# Patient Record
Sex: MALE | Race: ASIAN | NOT HISPANIC OR LATINO | Employment: FULL TIME | URBAN - METROPOLITAN AREA
[De-identification: names, ages, dates, MRNs, and addresses within clinical notes are randomized per-mention and may not be internally consistent; named-entity substitution may affect disease eponyms.]

---

## 2021-05-25 NOTE — LETTER
[employer]  Timi Varner M.D.  [address] [place] [address]  Fax: [phone]   Authorization for Release/Disclosure of   Protected Health Information   Name: DEACON WATSON : 1960 SSN: [id number]   Address: [address] [place] [address]  [place] [address] Phone:    [phone] (home)    I authorize the entity listed below to release/disclose the PHI below to:   [facility] [employer]/Timi Varner M.D. and Mily Nunez M.D.   Provider or Entity Name:  [facility]: Dr. Lizy Salmeron M.D.   Address   City, State, Zip   Phone:  [phone]    Fax:  [phone]   Reason for request: continuity of care   Information to be released:    [  ] LAST COLONOSCOPY,  including any PATH REPORT and follow-up  [  ] LAST FIT/COLOGUARD RESULT [  ] LAST DEXA  [  ] LAST MAMMOGRAM  [  ] LAST PAP  [  ] LAST LABS [  ] RETINA EXAM REPORT  [  ] IMMUNIZATION RECORDS  [ XXXX ] Release all info      [  ] Check here and initial the line next to each item to release ALL health information INCLUDING  _____ Care and treatment for drug and / or alcohol abuse  _____ HIV testing, infection status, or AIDS  _____ Genetic Testing    DATES OF SERVICE OR TIME PERIOD TO BE DISCLOSED: _____________  I understand and acknowledge that:  * This Authorization may be revoked at any time by you in writing, except if your health information has already been used or disclosed.  * Your health information that will be used or disclosed as a result of you signing this authorization could be re-disclosed by the recipient. If this occurs, your re-disclosed health information may no longer be protected by State or Federal laws.  * You may refuse to sign this Authorization. Your refusal will not affect your ability to obtain treatment.  * This Authorization becomes effective upon signing and will  on (date) __________.      If no date is indicated, this Authorization will  one (1) year from the signature date.    Name: Deacon  Arvind    Signature: Continuity of Care   Date:     5/25/2021       PLEASE FAX REQUESTED RECORDS BACK TO: [phone]

## 2021-05-25 NOTE — PROGRESS NOTES
NEW PATIENT VISIT PRE-VISIT PLANNING      1st attempt: Tuesday, 05/25/201@1:07PM  Phone Number Called: [phone] (home)   Call outcome: Spoke to patient regarding message below.  Message: Advised office visit scheduled with , Thursday, 05/27/21@4:00PM, [address]. Patient ended call stating he was at work. Request patient return call. Need to complete Pre-Visit Planning.    Returned call: Tuesday, 05/25/2021@3:49PM  1. Caller Name: Kenneth Samuels      Call Back Number: [phone] (home)  Patient returned call. Pre-Visit planning completed.     1.  E[employer] Patient is checked in Patient Demographics?Yes    2.  Immunizations were updated in Epic using Reconcile Outside Information activity? Yes         3.  Is this appointment scheduled as a Hospital Follow-Up? No    4.  Patient is due for the following Health Maintenance Topics:   Health Maintenance Due   Topic Date Due   • HEPATITIS C SCREENING  Never done   • IMM DTaP/Tdap/Td Vaccine (1 - Tdap) Never done   • COLONOSCOPY  Never done   • IMM ZOSTER VACCINES (1 of 2) Never done     5.  Reviewed/Updated the following with patient:       •   Preferred Pharmacy? Yes       •   Preferred Lab? Yes       •   Preferred Communication? Yes       •   Allergies? Yes       •   Medications? Yes       •   Social History? Yes       •   Family History (document living status of immediate family members and if + hx of  cancer, diabetes, hypertension, hyperlipidemia, heart attack, stroke): Reviewed with patient    6.  Updated Care Team?       •   DME Company (gait device, O2, CPAP, etc.): Per patient none       •   Other Specialists (eye doctor, derm, GYN, cardiology, endo, etc): Prior PCP Dr. Lizy Salmeron M.D.-[facility]. Care Teams updated and medical records requested.    7.  AHA (Puls[employer]) form printed for Provider? N/A

## 2021-05-27 ENCOUNTER — OFFICE VISIT (OUTPATIENT)
Dept: MEDICAL GROUP | Age: 61
End: 2021-05-27
Payer: COMMERCIAL

## 2021-05-27 VITALS
WEIGHT: 144 LBS | HEIGHT: 68 IN | HEART RATE: 73 BPM | OXYGEN SATURATION: 100 % | TEMPERATURE: 98.6 F | DIASTOLIC BLOOD PRESSURE: 80 MMHG | SYSTOLIC BLOOD PRESSURE: 120 MMHG | BODY MASS INDEX: 21.82 KG/M2

## 2021-05-27 DIAGNOSIS — Z12.12 SCREENING FOR COLORECTAL CANCER: ICD-10-CM

## 2021-05-27 DIAGNOSIS — M21.70 LEG LENGTH DISCREPANCY: ICD-10-CM

## 2021-05-27 DIAGNOSIS — R35.0 BENIGN PROSTATIC HYPERPLASIA WITH URINARY FREQUENCY: ICD-10-CM

## 2021-05-27 DIAGNOSIS — Z76.89 ENCOUNTER TO ESTABLISH CARE WITH NEW DOCTOR: Primary | ICD-10-CM

## 2021-05-27 DIAGNOSIS — M54.15 RADICULOPATHY OF THORACOLUMBAR REGION: ICD-10-CM

## 2021-05-27 DIAGNOSIS — Z12.11 SCREENING FOR COLORECTAL CANCER: ICD-10-CM

## 2021-05-27 DIAGNOSIS — Q67.5 CONGENITAL SCOLIOSIS: ICD-10-CM

## 2021-05-27 DIAGNOSIS — Z11.59 NEED FOR HEPATITIS C SCREENING TEST: ICD-10-CM

## 2021-05-27 DIAGNOSIS — N40.1 BENIGN PROSTATIC HYPERPLASIA WITH URINARY FREQUENCY: ICD-10-CM

## 2021-05-27 DIAGNOSIS — Z00.00 PE (PHYSICAL EXAM), ANNUAL: ICD-10-CM

## 2021-05-27 LAB
APPEARANCE UR: CLEAR
BILIRUB UR STRIP-MCNC: NEGATIVE MG/DL
COLOR UR AUTO: YELLOW
GLUCOSE UR STRIP.AUTO-MCNC: NEGATIVE MG/DL
KETONES UR STRIP.AUTO-MCNC: NEGATIVE MG/DL
LEUKOCYTE ESTERASE UR QL STRIP.AUTO: NEGATIVE
NITRITE UR QL STRIP.AUTO: NEGATIVE
PH UR STRIP.AUTO: 5.5 [PH] (ref 5–8)
PROT UR QL STRIP: NEGATIVE MG/DL
RBC UR QL AUTO: NEGATIVE
SP GR UR STRIP.AUTO: 1.02
UROBILINOGEN UR STRIP-MCNC: 0.2 MG/DL

## 2021-05-27 PROCEDURE — 99204 OFFICE O/P NEW MOD 45 MIN: CPT | Performed by: FAMILY MEDICINE

## 2021-05-27 PROCEDURE — 81002 URINALYSIS NONAUTO W/O SCOPE: CPT | Performed by: FAMILY MEDICINE

## 2021-05-27 RX ORDER — TAMSULOSIN HYDROCHLORIDE 0.4 MG/1
0.4 CAPSULE ORAL
Qty: 90 CAPSULE | Refills: 1 | Status: SHIPPED | OUTPATIENT
Start: 2021-05-27 | End: 2022-10-10

## 2021-05-27 RX ORDER — GABAPENTIN 100 MG/1
100 CAPSULE ORAL 3 TIMES DAILY
Qty: 90 CAPSULE | Refills: 11 | Status: SHIPPED | OUTPATIENT
Start: 2021-05-27 | End: 2022-10-10

## 2021-05-27 ASSESSMENT — PATIENT HEALTH QUESTIONNAIRE - PHQ9: CLINICAL INTERPRETATION OF PHQ2 SCORE: 0

## 2021-05-27 NOTE — LETTER
[facility]  Mily Nunez M.D.  [address]   [place] [address]  Fax: [phone]   Authorization for Release/Disclosure of   Protected Health Information   Name: DEACON WATSON : 1960 SSN: [id number]   Address: [address] [place] [address] Phone:    [phone] (home)    I authorize the entity listed below to release/disclose the PHI below to:   [facility]/Mily Nunez M.D. and Mily Nunez M.D.   Provider or Entity Name:  [facility]   Address   City, State, [facility]   Phone:      Fax:  [phone]   Reason for request: continuity of care   Information to be released:    [ x ] LAST COLONOSCOPY,  including any PATH REPORT and follow-up  [  ] LAST FIT/COLOGUARD RESULT [  ] LAST DEXA  [  ] LAST MAMMOGRAM  [  ] LAST PAP  [  ] LAST LABS [  ] RETINA EXAM REPORT  [  ] IMMUNIZATION RECORDS  [  ] Release all info      [  ] Check here and initial the line next to each item to release ALL health information INCLUDING  _____ Care and treatment for drug and / or alcohol abuse  _____ HIV testing, infection status, or AIDS  _____ Genetic Testing    DATES OF SERVICE OR TIME PERIOD TO BE DISCLOSED: _____________  I understand and acknowledge that:  * This Authorization may be revoked at any time by you in writing, except if your health information has already been used or disclosed.  * Your health information that will be used or disclosed as a result of you signing this authorization could be re-disclosed by the recipient. If this occurs, your re-disclosed health information may no longer be protected by State or Federal laws.  * You may refuse to sign this Authorization. Your refusal will not affect your ability to obtain treatment.  * This Authorization becomes effective upon signing and will  on (date) __________.      If no date is indicated, this Authorization will  one (1) year from the signature date.    Name: Deacon Watson    Signature:   Date:     2021       PLEASE FAX REQUESTED RECORDS BACK TO: [phone]

## 2021-05-27 NOTE — PROGRESS NOTES
Subjective:   CC: establish care     HPI:     Kenneth Samuels is a 61 y.o. 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 He had corrective surgery when he was a child in [place]. He again had another corrective surgery in 1992 in the [facility]. He continues to have persistent chronic back pain with persistent numbness of the right leg. He is not able to sit or stand for a long time. He has leg length discrepancy. He wears inserts to correct this. He has consultation with [facility] a few years ago. Repeat MRI was ordered, but it was not completed as he is claustrophobic. He requested open MRI in [place], but states that his request was not approved by provider. He therefore has lost followed up with [facility]. At this time, he is not sure if he wants to pursue further treatment. He states that he has been discriminated by his employer and was released from work several times due to his disability. He wants to apply for permanent disability, but he is also not sure. He states that he is not able to stand for a long time, so he needs accommodation at work. He is not able to lift well. So he has to rely on coworker to help with this. However, all coworkers are eventually tired of needing to help him with his work without extra pay. He is , has 1 child, not sexually active. He has no known diseases. He currently dose not take any medication.     He complained of frequent nocturia and daytime urinary frequency, onset a few years ago, but recently got worse. Neg fever, chills, flank pain, dysuria, hematuria.       Current medicines (including changes today)  Current Outpatient Medications   Medication Sig Dispense Refill   • tamsulosin (FLOMAX) 0.4 MG capsule Take 1 capsule by mouth 1/2 hour after breakfast. 90 capsule 1   • gabapentin (NEURONTIN) 100 MG Cap Take 1 capsule by  "mouth 3 times a day. 90 capsule 11   • Ascorbic Acid (VITAMIN C PO) Take  by mouth.       No current facility-administered medications for this visit.     He  has no past medical history of ASTHMA, COPD, or Diabetes.    I personally reviewed patient's problem list, allergies, medications, family hx, social hx with patient and update EPIC.     REVIEW OF SYSTEMS:  CONSTITUTIONAL:  Denies night sweats, fatigue, malaise, lethargy, fever or chills.  RESPIRATORY:  Denies cough, wheeze, hemoptysis, or shortness of breath.  CARDIOVASCULAR:  Denies chest pains, palpitations, pedal edema     Objective:     /80 (BP Location: Right arm, Patient Position: Sitting, BP Cuff Size: Adult)   Pulse 73   Temp 37 °C (98.6 °F) (Temporal)   Ht 1.727 m (5' 8\")   Wt 65.3 kg (144 lb)   SpO2 100%  Body mass index is 21.9 kg/m².    Physical Exam:  Constitutional: awake, alert, in no distress.  Skin: Warm, dry, good turgor, no rashes, bruises, ulcers in visible areas.  Eye: conjunctiva clear, lids neg for edema or lesions.  ENMT: TM and auditory canals wnl.    Neck: Trachea midline, no masses, no thyromegaly. No cervical or supraclavicular lymphadenopathy  Respiratory: Unlabored respiratory effort, lungs clear to auscultation, no wheezes, no rales.  Cardiovascular: Normal S1, S2, no murmur, no pedal edema.  Abdomen: Soft, non-tender to palpation, active BS, no hernia, no   Psych: Oriented x3, affect and mood wnl, intact judgement and insight.   MSK:   - thoracolumbar scoliosis  - leg length discrepancy       Assessment and Plan:   The following treatment plan was discussed    1. Congenital scoliosis  2. Radiculopathy of thoracolumbar region  Congential scoliosis s/p 2 corrective surgeries: one during childhood in [place], last surgery was in 1992. He continues to have radiculopathy and chronic lower back pain interfering with his ability to maintain full time employment and quality of life. He was seen by [facility] a few years " ago, but lost follow up after he was not able to complete the MRI due to claustrophobia. He is not sure of his options at this time, but he wants to see neurosurgery again for consultation.   - REFERRAL TO NEUROSURGERY  - gabapentin (NEURONTIN) 100 MG Cap; Take 1 capsule by mouth 3 times a day.  Dispense: 90 capsule; Refill: 11    3. Leg length discrepancy  Secondary to severe thoracolumbar scoliosis   Wears shoe inserts     4. Benign prostatic hyperplasia with urinary frequency  - tamsulosin (FLOMAX) 0.4 MG capsule; Take 1 capsule by mouth 1/2 hour after breakfast.  Dispense: 90 capsule; Refill: 1  - POCT Urinalysis    5. Screening for colorectal cancer   COLOGUARD (FIT DNA)    6. Need for hepatitis C screening test  - HEP C VIRUS ANTIBODY; Future    7. Encounter to establish care with new doctor  General health and wellness counseling provided.     - CBC WITH DIFFERENTIAL; Future  - Comp Metabolic Panel; Future  - HEMOGLOBIN A1C; Future  - Lipid Profile; Future  - POCT Urinalysis    Mily Nunez M.D.      Followup: Return for As needed.    Please note that this dictation was created using voice recognition software. I have made every reasonable attempt to correct obvious errors, but I expect that there are errors of grammar and possibly content that I did not discover before finalizing the note.

## 2021-05-27 NOTE — LETTER
[facility]  Mily Nunez M.D.  [address]   [place] [address]  Fax: [phone]   Authorization for Release/Disclosure of   Protected Health Information   Name: DEACON WATSON : 1960 SSN: [id number]   Address: [address] [place] [address] Phone:    [phone] (home)    I authorize the entity listed below to release/disclose the PHI below to:   [facility]/Mily Nunez M.D. and Mily Nunez M.D.   Provider or Entity Name:  [facility]   Phone:      Fax:  [phone]   Reason for request: continuity of care   Information to be released:    [  ] LAST COLONOSCOPY,  including any PATH REPORT and follow-up  [  ] LAST FIT/COLOGUARD RESULT [  ] LAST DEXA  [  ] LAST MAMMOGRAM  [  ] LAST PAP  [  ] LAST LABS [  ] RETINA EXAM REPORT  [  ] IMMUNIZATION RECORDS  [x  ] Release all info      [  ] Check here and initial the line next to each item to release ALL health information INCLUDING  _____ Care and treatment for drug and / or alcohol abuse  _____ HIV testing, infection status, or AIDS  _____ Genetic Testing    DATES OF SERVICE OR TIME PERIOD TO BE DISCLOSED: _____________  I understand and acknowledge that:  * This Authorization may be revoked at any time by you in writing, except if your health information has already been used or disclosed.  * Your health information that will be used or disclosed as a result of you signing this authorization could be re-disclosed by the recipient. If this occurs, your re-disclosed health information may no longer be protected by State or Federal laws.  * You may refuse to sign this Authorization. Your refusal will not affect your ability to obtain treatment.  * This Authorization becomes effective upon signing and will  on (date) __________.      If no date is indicated, this Authorization will  one (1) year from the signature date.    Name: Deacon Watson    Signature:   Date:     2021       PLEASE FAX REQUESTED RECORDS BACK TO:  [phone]

## 2021-05-29 ENCOUNTER — HOSPITAL ENCOUNTER (OUTPATIENT)
Dept: LAB | Facility: MEDICAL CENTER | Age: 61
End: 2021-05-29
Attending: FAMILY MEDICINE
Payer: COMMERCIAL

## 2021-05-29 DIAGNOSIS — Z11.59 NEED FOR HEPATITIS C SCREENING TEST: ICD-10-CM

## 2021-05-29 DIAGNOSIS — Z00.00 PE (PHYSICAL EXAM), ANNUAL: ICD-10-CM

## 2021-05-29 LAB
ALBUMIN SERPL BCP-MCNC: 4.3 G/DL (ref 3.2–4.9)
ALBUMIN/GLOB SERPL: 1.5 G/DL
ALP SERPL-CCNC: 51 U/L (ref 30–99)
ALT SERPL-CCNC: 14 U/L (ref 2–50)
ANION GAP SERPL CALC-SCNC: 7 MMOL/L (ref 7–16)
AST SERPL-CCNC: 19 U/L (ref 12–45)
BASOPHILS # BLD AUTO: 0.9 % (ref 0–1.8)
BASOPHILS # BLD: 0.04 K/UL (ref 0–0.12)
BILIRUB SERPL-MCNC: 1.4 MG/DL (ref 0.1–1.5)
BUN SERPL-MCNC: 11 MG/DL (ref 8–22)
CALCIUM SERPL-MCNC: 9 MG/DL (ref 8.5–10.5)
CHLORIDE SERPL-SCNC: 101 MMOL/L (ref 96–112)
CHOLEST SERPL-MCNC: 182 MG/DL (ref 100–199)
CO2 SERPL-SCNC: 29 MMOL/L (ref 20–33)
CREAT SERPL-MCNC: 0.74 MG/DL (ref 0.5–1.4)
EOSINOPHIL # BLD AUTO: 0.32 K/UL (ref 0–0.51)
EOSINOPHIL NFR BLD: 7.5 % (ref 0–6.9)
ERYTHROCYTE [DISTWIDTH] IN BLOOD BY AUTOMATED COUNT: 42.9 FL (ref 35.9–50)
EST. AVERAGE GLUCOSE BLD GHB EST-MCNC: 105 MG/DL
FASTING STATUS PATIENT QL REPORTED: NORMAL
GLOBULIN SER CALC-MCNC: 2.8 G/DL (ref 1.9–3.5)
GLUCOSE SERPL-MCNC: 108 MG/DL (ref 65–99)
HBA1C MFR BLD: 5.3 % (ref 4–5.6)
HCT VFR BLD AUTO: 47 % (ref 42–52)
HCV AB SER QL: NORMAL
HDLC SERPL-MCNC: 49 MG/DL
HGB BLD-MCNC: 15.8 G/DL (ref 14–18)
IMM GRANULOCYTES # BLD AUTO: 0 K/UL (ref 0–0.11)
IMM GRANULOCYTES NFR BLD AUTO: 0 % (ref 0–0.9)
LDLC SERPL CALC-MCNC: 119 MG/DL
LYMPHOCYTES # BLD AUTO: 1.83 K/UL (ref 1–4.8)
LYMPHOCYTES NFR BLD: 42.7 % (ref 22–41)
MCH RBC QN AUTO: 31.7 PG (ref 27–33)
MCHC RBC AUTO-ENTMCNC: 33.6 G/DL (ref 33.7–35.3)
MCV RBC AUTO: 94.4 FL (ref 81.4–97.8)
MONOCYTES # BLD AUTO: 0.34 K/UL (ref 0–0.85)
MONOCYTES NFR BLD AUTO: 7.9 % (ref 0–13.4)
NEUTROPHILS # BLD AUTO: 1.76 K/UL (ref 1.82–7.42)
NEUTROPHILS NFR BLD: 41 % (ref 44–72)
NRBC # BLD AUTO: 0 K/UL
NRBC BLD-RTO: 0 /100 WBC
PLATELET # BLD AUTO: 234 K/UL (ref 164–446)
PMV BLD AUTO: 9.9 FL (ref 9–12.9)
POTASSIUM SERPL-SCNC: 4.6 MMOL/L (ref 3.6–5.5)
PROT SERPL-MCNC: 7.1 G/DL (ref 6–8.2)
RBC # BLD AUTO: 4.98 M/UL (ref 4.7–6.1)
SODIUM SERPL-SCNC: 137 MMOL/L (ref 135–145)
TRIGL SERPL-MCNC: 69 MG/DL (ref 0–149)
WBC # BLD AUTO: 4.3 K/UL (ref 4.8–10.8)

## 2021-05-29 PROCEDURE — 80053 COMPREHEN METABOLIC PANEL: CPT

## 2021-05-29 PROCEDURE — 36415 COLL VENOUS BLD VENIPUNCTURE: CPT

## 2021-05-29 PROCEDURE — 80061 LIPID PANEL: CPT

## 2021-05-29 PROCEDURE — 85025 COMPLETE CBC W/AUTO DIFF WBC: CPT

## 2021-05-29 PROCEDURE — 83036 HEMOGLOBIN GLYCOSYLATED A1C: CPT

## 2021-05-29 PROCEDURE — 86803 HEPATITIS C AB TEST: CPT

## 2021-06-03 DIAGNOSIS — Z00.00 PE (PHYSICAL EXAM), ANNUAL: ICD-10-CM

## 2021-06-03 PROBLEM — E78.00 PURE HYPERCHOLESTEROLEMIA: Status: ACTIVE | Noted: 2021-06-03

## 2021-06-03 PROBLEM — R73.01 IMPAIRED FASTING BLOOD SUGAR: Status: ACTIVE | Noted: 2021-06-03

## 2021-11-08 ENCOUNTER — NON-PROVIDER VISIT (OUTPATIENT)
Dept: OCCUPATIONAL MEDICINE | Facility: CLINIC | Age: 61
End: 2021-11-08
Payer: COMMERCIAL

## 2021-11-08 ENCOUNTER — HOSPITAL ENCOUNTER (EMERGENCY)
Facility: MEDICAL CENTER | Age: 61
End: 2021-11-08
Attending: EMERGENCY MEDICINE
Payer: COMMERCIAL

## 2021-11-08 VITALS
SYSTOLIC BLOOD PRESSURE: 130 MMHG | HEART RATE: 79 BPM | HEIGHT: 66 IN | TEMPERATURE: 98.7 F | DIASTOLIC BLOOD PRESSURE: 99 MMHG | OXYGEN SATURATION: 97 % | RESPIRATION RATE: 18 BRPM | WEIGHT: 149.03 LBS | BODY MASS INDEX: 23.95 KG/M2

## 2021-11-08 DIAGNOSIS — R04.0 EPISTAXIS: ICD-10-CM

## 2021-11-08 DIAGNOSIS — Z02.89 VISIT FOR OCCUPATIONAL HEALTH EXAMINATION: Primary | ICD-10-CM

## 2021-11-08 DIAGNOSIS — Z77.098 CHEMICAL EXPOSURE: ICD-10-CM

## 2021-11-08 LAB
AMP AMPHETAMINE: NORMAL
BAR BARBITURATES: NORMAL
BREATH ALCOHOL COMMENT: NORMAL
BZO BENZODIAZEPINES: NORMAL
COC COCAINE: NORMAL
INT CON NEG: NORMAL
INT CON POS: NORMAL
MDMA ECSTASY: NORMAL
MET METHAMPHETAMINES: NORMAL
MTD METHADONE: NORMAL
OPI OPIATES: NORMAL
OXY OXYCODONE: NORMAL
PCP PHENCYCLIDINE: NORMAL
POC BREATHALIZER: 0 PERCENT (ref 0–0.01)
POC URINE DRUG SCREEN OCDRS: NORMAL
THC: NORMAL

## 2021-11-08 PROCEDURE — A9270 NON-COVERED ITEM OR SERVICE: HCPCS | Performed by: EMERGENCY MEDICINE

## 2021-11-08 PROCEDURE — 82075 ASSAY OF BREATH ETHANOL: CPT | Performed by: PREVENTIVE MEDICINE

## 2021-11-08 PROCEDURE — 700102 HCHG RX REV CODE 250 W/ 637 OVERRIDE(OP): Performed by: EMERGENCY MEDICINE

## 2021-11-08 PROCEDURE — 99283 EMERGENCY DEPT VISIT LOW MDM: CPT

## 2021-11-08 PROCEDURE — 700101 HCHG RX REV CODE 250: Performed by: EMERGENCY MEDICINE

## 2021-11-08 PROCEDURE — 80305 DRUG TEST PRSMV DIR OPT OBS: CPT | Performed by: PREVENTIVE MEDICINE

## 2021-11-08 RX ORDER — HYDROCODONE BITARTRATE AND ACETAMINOPHEN 5; 325 MG/1; MG/1
1 TABLET ORAL EVERY 6 HOURS PRN
Qty: 10 TABLET | Refills: 0 | Status: SHIPPED | OUTPATIENT
Start: 2021-11-08 | End: 2021-11-11

## 2021-11-08 RX ORDER — AMOXICILLIN 250 MG/1
500 CAPSULE ORAL ONCE
Status: COMPLETED | OUTPATIENT
Start: 2021-11-08 | End: 2021-11-08

## 2021-11-08 RX ORDER — LIDOCAINE HYDROCHLORIDE AND EPINEPHRINE 10; 10 MG/ML; UG/ML
20 INJECTION, SOLUTION INFILTRATION; PERINEURAL ONCE
Status: COMPLETED | OUTPATIENT
Start: 2021-11-08 | End: 2021-11-08

## 2021-11-08 RX ORDER — BENZOCAINE/MENTHOL 6 MG-10 MG
1 LOZENGE MUCOUS MEMBRANE 2 TIMES DAILY
Qty: 1.5 G | Refills: 0 | Status: SHIPPED | OUTPATIENT
Start: 2021-11-08 | End: 2021-11-11

## 2021-11-08 RX ORDER — HYDROCODONE BITARTRATE AND ACETAMINOPHEN 5; 325 MG/1; MG/1
1 TABLET ORAL ONCE
Status: COMPLETED | OUTPATIENT
Start: 2021-11-08 | End: 2021-11-08

## 2021-11-08 RX ORDER — ECHINACEA PURPUREA EXTRACT 125 MG
1 TABLET ORAL PRN
Qty: 88 ML | Refills: 0 | Status: SHIPPED | OUTPATIENT
Start: 2021-11-08 | End: 2021-11-11

## 2021-11-08 RX ORDER — AMOXICILLIN 500 MG/1
500 CAPSULE ORAL 3 TIMES DAILY
Qty: 30 CAPSULE | Refills: 0 | Status: SHIPPED | OUTPATIENT
Start: 2021-11-08 | End: 2022-10-10

## 2021-11-08 RX ADMIN — HYDROCODONE BITARTRATE AND ACETAMINOPHEN 1 TABLET: 5; 325 TABLET ORAL at 14:15

## 2021-11-08 RX ADMIN — AMOXICILLIN 500 MG: 250 CAPSULE ORAL at 14:15

## 2021-11-08 RX ADMIN — LIDOCAINE HYDROCHLORIDE AND EPINEPHRINE 20 ML: 10; 10 INJECTION, SOLUTION INFILTRATION; PERINEURAL at 14:00

## 2021-11-08 RX ADMIN — PHENYLEPHRINE HYDROCHLORIDE 1 SPRAY: 1 SPRAY NASAL at 14:00

## 2021-11-08 ASSESSMENT — FIBROSIS 4 INDEX: FIB4 SCORE: 1.32

## 2021-11-08 NOTE — ED PROVIDER NOTES
ED Provider Note    Scribed for Gabrielle Bo M.D. by Laura Myers. 11/8/2021  12:31 PM    Primary care provider: Mily Nunez M.D.  Means of arrival: Walk in  History obtained from: patient   History limited by: none    CHIEF COMPLAINT  Chief Complaint   Patient presents with    Nose Bleed    Rash    Chemical Exposure       HPI  Kenneth Samuels is a 61 y.o. male who presents to the Emergency Department for evaluation after a chemical exposure onset today at work. 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 He has associated epistaxis and rash to his face. Patient was not wearing a mask for eye protection during his exposure. Denies shortness of breath.     REVIEW OF SYSTEMS  HEENT:  + epistaxis. No ear pain, congestion, or sore throat   EYES: no discharge, redness, or vision changes  CARDIAC: no chest pain, no palpitations    PULMONARY: no dyspnea, cough, or congestion   GI: no vomiting, diarrhea, or abdominal pain   : no dysuria, back pain, or hematuria   Neuro: no weakness, numbness, aphasia, or headache  Musculoskeletal: no swelling, deformity, pain, or joint swelling  Endocrine: no fevers, sweating, or weight loss   SKIN: + rash to face. no erythema, or contusions     See history of present illness.     PAST MEDICAL HISTORY   None pertinent    SURGICAL HISTORY  patient denies any surgical history    SOCIAL HISTORY  Social History     Tobacco Use    Smoking status: Never Smoker    Smokeless tobacco: Never Used   Vaping Use    Vaping Use: Never used   Substance Use Topics    Alcohol use: Not Currently    Drug use: Never      Social History     Substance and Sexual Activity   Drug Use Never       FAMILY HISTORY  Family History   Problem Relation Age of Onset    No Known Problems Mother     No Known Problems Father     No Known Problems Sister     No Known Problems Brother     No Known Problems Maternal Grandmother     No Known Problems Maternal  "Grandfather     No Known Problems Paternal Grandmother     No Known Problems Paternal Grandfather     No Known Problems Brother        CURRENT MEDICATIONS  Home Medications    **Home medications have not yet been reviewed for this encounter**         ALLERGIES  No Known Allergies    PHYSICAL EXAM  VITAL SIGNS: /100   Pulse 87   Temp 36.9 °C (98.5 °F) (Temporal)   Resp 18   Ht 1.676 m (5' 6\")   Wt 67.6 kg (149 lb 0.5 oz)   SpO2 96%   BMI 24.05 kg/m²     Constitutional: Well developed, Well nourished, No acute distress, Non-toxic appearance.   HEENT: Normocephalic, Atraumatic,  external ears normal, pharynx pink,  Mucous  Membranes moist, No rhinorrhea or mucosal edema. Trickling blood mostly from left nares. No heavy bleeding bleeding in the posterior pharynx.  Mild skin erythema around the mouth but no rash in the buccal mucosal or buccal mucosal swelling  Eyes: PERRL, EOMI, Conjunctiva normal, No discharge.   Neck: Normal range of motion, No tenderness, Supple, No stridor.   Lymphatic: No lymphadenopathy    Cardiovascular: Regular Rate and Rhythm, No murmurs,  rubs, or gallops.   Thorax & Lungs: Lungs clear to auscultation bilaterally, No airway swelling No respiratory distress, No wheezes, rhales or rhonchi, No chest wall tenderness.   Abdomen: Bowel sounds normal, Soft, non tender, non distended,  No pulsatile masses., no rebound guarding or peritoneal signs.   Skin: Warm, Dry, Redness around face, nose and mouth.  Back:  No CVA tenderness,  No spinal tenderness, bony crepitance, step offs, or instability.   Neurologic: Alert & oriented clear speech no focal deficits  Extremities: Equal, intact distal pulses, No cyanosis, clubbing or edema,  No tenderness.   Musculoskeletal: Good range of motion in all major joints. No tenderness to palpation or major deformities noted.       COURSE & MEDICAL DECISION MAKING  Nursing notes, VS, PMSFHx reviewed in chart.    12:31 PM Patient seen and examined at " bedside. Spoke with [employer], case number [id number], who states the chemical is an irritant and he needs to shower to remove the chemical from his skin. We will decon and shower the patient in the ED. Advised him to wear a face shield and mask while at work. Prescribed hydrocortizone cream to apply to his face and ocean nasal spray.     1:33 PM - Patient has continuing nasal bleeding. He will be treated with phenylephrine and lidocaine with epinephrine 1% soaked gauze.     2:11 PM - Packed the patients nose at this time. He will be treated with Norco. Prescribed amoxicillin and advised home to have the packing removed in around 2 days. Discussed return precautions. Patient will be discharged at this time. He verbalizes agreement with discharge and plan of care.     The patient will return for new or worsening symptoms and is stable at the time of discharge.  In prescribing controlled substances to this patient, I certify that I have obtained and reviewed the medical history of Kenneth Samuels. I have also made a good adia effort to obtain applicable records from other providers who have treated the patient and records did not demonstrate any increased risk of substance abuse that would prevent me from prescribing controlled substances.     I have conducted a physical exam and documented it. I have reviewed Mr. Samuels’s prescription history as maintained by the [place] Prescription Monitoring Program.     I have assessed the patient’s risk for abuse, dependency, and addiction using the validated Opioid Risk Tool available at https://www.mdcalc.com/zhndfl-dhkk-vipb-ort-narcotic-abuse.     Given the above, I believe the benefits of controlled substance therapy outweigh the risks. The reasons for prescribing controlled substances include non-narcotic, oral analgesic alternatives have been inadequate for pain control. Accordingly, I have discussed the risk and benefits, treatment plan, and alternative therapies with the  patient.       DISPOSITION:  Patient will be discharged home in stable condition.    FOLLOW UP:  [facility]  [address]  [address]  [place] [address] [phone]  Call in 1 day  for recheck, to establish care and for packing removal      OUTPATIENT MEDICATIONS:  Discharge Medication List as of 11/8/2021  3:06 PM        START taking these medications    Details   hydrocortisone 1 % Cream Apply 1 Application topically 2 times a day for 3 days., Disp-1.5 g, R-0, Normal      sodium chloride (OCEAN) 0.65 % Solution Administer 1 Spray into affected nostril(S) as needed for Congestion for up to 3 days., Disp-88 mL, R-0, Normal      HYDROcodone-acetaminophen (NORCO) 5-325 MG Tab per tablet Take 1 Tablet by mouth every 6 hours as needed for up to 3 days., Disp-10 Tablet, R-0, Normal      amoxicillin (AMOXIL) 500 MG Cap Take 1 Capsule by mouth 3 times a day., Disp-30 Capsule, R-0, Normal            FINAL IMPRESSION  1. Epistaxis    2. Chemical exposure          Laura ETIENNE (Scribe), am scribing for, and in the presence of, Gabrielle Bo M.D..    Electronically signed by: Laura Myers (Fuad), 11/8/2021    Gabrielle ETIENNE M.D. personally performed the services described in this documentation, as scribed by Laura Myers in my presence, and it is both accurate and complete.    The note accurately reflects work and decisions made by me.  Gabrielle Bo M.D.  11/8/2021  6:51 PM

## 2021-11-08 NOTE — ED NOTES
Discharge instructions given and discussed. RX sent to preferred pharmacy Pt educated to come back to ER for new or worsening symptoms and follow up with [facility] for f/u and packing removal as instructed. Pt verbalized understanding. VSS. Pt  Discharged in stable condition.

## 2021-11-08 NOTE — ED TRIAGE NOTES
"Chief Complaint   Patient presents with   • Nose Bleed   • Rash   • Chemical Exposure     sudden onset of rash and nose bleed today at work. 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 Pt bib by supervisor and states pt might have been exposed to a ceramic compound, duralco 128, commonly used in pt's work setting.   "

## 2021-11-08 NOTE — LETTER
"  FORM C-4:  EMPLOYEE’S CLAIM FOR COMPENSATION/ REPORT OF INITIAL TREATMENT  EMPLOYEE’S CLAIM - PROVIDE ALL INFORMATION REQUESTED   First Name Kenneth Last Name Arvind Birthdate 1960  Sex male Claim Number   Home Address [address]             Zip [address]                                   Age  61 y.o. Height  1.676 m (5' 6\") Weight  67.6 kg (149 lb 0.5 oz) [facility]     Mailing Address [address]              Zip [address] Telephone  [phone] (home)  Primary Language Spoken   Insurer   Third Party   [employer] Employee's Occupation (Job Title) When Injury or Occupational Disease Occurred     Employer's Name [employer] Telephone [phone]    Employer Address [address] [[address]] Zip [address]   Date of Injury  11/8/2021       Hour of Injury  10:00 AM Date Employer Notified  11/8/2021 Last Day of Work after Injury or Occupational Disease  11/8/2021 Supervisor to Whom Injury Reported  Baldo Ngo   Address or Location of Accident (if applicable) Work [1]   What were you doing at the time of accident? (if applicable) working    How did this injury or occupational disease occur? Be specific and answer in detail. Use additional sheet if necessary)  working and start to get alergic reaction on hands, mouth   If you believe that you have an occupational disease, when did you first have knowledge of the disability and it relationship to your employment? n/a Witnesses to the Accident  Chela Franklin   Nature of Injury or Occupational Disease  Workers' Compensation Part(s) of Body Injured or Affected  Hand (L), Mouth, Nose    I CERTIFY THAT THE ABOVE IS TRUE AND CORRECT TO THE BEST OF MY KNOWLEDGE AND THAT I HAVE PROVIDED THIS INFORMATION IN ORDER TO OBTAIN THE BENEFITS OF [place] IN[place]IVE OR CHAPTER 617 OF [place]).  I " HEREBY AUTHORIZE ANY PHYSICIAN, CHIROPRACTOR, SURGEON, PRACTITIONER, OR OTHER PERSON, ANY HOSPITAL, INCLUDING [facility] OR [facility], ANY MEDICAL SERVICE ORGANIZATION, ANY INSURANCE COMPANY, OR OTHER INSTITUTION OR ORGANIZATION TO RELEASE TO EACH OTHER, ANY MEDICAL OR OTHER INFORMATION, INCLUDING BENEFITS PAID OR PAYABLE, PERTINENT TO THIS INJURY OR DISEASE, EXCEPT INFORMATION RELATIVE TO DIAGNOSIS, TREATMENT AND/OR COUNSELING FOR AIDS, PSYCHOLOGICAL CONDITIONS, ALCOHOL OR CONTROLLED SUBSTANCES, FOR WHICH I MUST GIVE SPECIFIC AUTHORIZATION.  A PHOTOSTAT OF THIS AUTHORIZATION SHALL BE AS VALID AS THE ORIGINAL.  Date 11/08/2021                            Place   [facility]                                   Employee’s Signature   THIS REPORT MUST BE COMPLETED AND MAILED WITHIN 3 WORKING DAYS OF TREATMENT   Place [facility], EMERGENCY DEPT                       Name of Facility [facility]   Date  11/8/2021 Diagnosis  (R04.0) Epistaxis  (Z77.098) Chemical exposure Is there evidence the injured employee was under the influence of alcohol and/or another controlled substance at the time of accident?   Hour  1:08 PM Description of Injury or Disease  Epistaxis  Chemical exposure No   Treatment  Washed patient   Have you advised the patient to remain off work five days or more?         No   X-Ray Findings    Comments:na If Yes   From Date    To Date      From information given by the employee, together with medical evidence, can you directly connect this injury or occupational disease as job incurred? Yes If No, is employee capable of: Full Duty  No Modified Duty  No   Is additional medical care by a physician indicated? Yes  Comments:occ health If Modified Duty, Specify any Limitations / Restrictions       Do you know of any previous injury or disease contributing to this condition or occupational disease? No    Date 11/8/2021 Print Doctor’s  "Name AnupamEja I certify the employer’s copy of this form was mailed on:   Address [address] [address] [phone] INSURER’S USE ONLY   Provider’s Tax ID Number  [id number] Telephone Dept: [phone]    Doctor’s Signature e-JUNI Gonzalez M.D. Degree  M.IRINA.      Form C-4 (rev.10/07)                                                                         BRIEF DESCRIPTION OF RIGHTS AND BENEFITS  (Pursuant to [place] 616C.050)    Notice of Injury or Occupational Disease (Incident Report Form C-1): If an injury or occupational disease (OD) arises out of and in the course of employment, you must provide written notice to your employer as soon as practicable, but no later than 7 days after the accident or OD. Your employer shall maintain a sufficient supply of the required forms.    Claim for Compensation (Form C-4): If medical treatment is sought, the form C-4 is available at the place of initial treatment. A completed \"Claim for Compensation\" (Form C-4) must be filed within 90 days after an accident or OD. The treating physician or chiropractor must, within 3 working days after treatment, complete and mail to the employer, the employer's insurer and third-party , the Claim for Compensation.    Medical Treatment: If you require medical treatment for your on-the-job injury or OD, you may be required to select a physician or chiropractor from a list provided by your workers’ compensation insurer, if it has contracted with an Organization for Managed Care (MCO) or Preferred Provider Organization (PPO) or providers of health care. If your employer has not entered into a contract with an MCO or PPO, you may select a physician or chiropractor from the Panel of Physicians and Chiropractors. Any medical costs related to your industrial injury or OD will be paid by your insurer.    Temporary Total Disability (TTD): If your doctor has certified that you are unable to work for a period of at " least 5 consecutive days, or 5 cumulative days in a 20-day period, or places restrictions on you that your employer does not accommodate, you may be entitled to TTD compensation.    Temporary Partial Disability (TPD): If the wage you receive upon reemployment is less than the compensation for TTD to which you are entitled, the insurer may be required to pay you TPD compensation to make up the difference. TPD can only be paid for a maximum of 24 months.    Permanent Partial Disability (PPD): When your medical condition is stable and there is an indication of a PPD as a result of your injury or OD, within 30 days, your insurer must arrange for an evaluation by a rating physician or chiropractor to determine the degree of your PPD. The amount of your PPD award depends on the date of injury, the results of the PPD evaluation, your age and wage.    Permanent Total Disability (PTD): If you are medically certified by a treating physician or chiropractor as permanently and totally disabled and have been granted a PTD status by your insurer, you are entitled to receive monthly benefits not to exceed 66 2/3% of your average monthly wage. The amount of your PTD payments is subject to reduction if you previously received a lump-sum PPD award.    Vocational Rehabilitation Services: You may be eligible for vocational rehabilitation services if you are unable to return to the job due to a permanent physical impairment or permanent restrictions as a result of your injury or occupational disease.    Transportation and Per Justin Reimbursement: You may be eligible for travel expenses and per justin associated with medical treatment.    Reopening: You may be able to reopen your claim if your condition worsens after claim closure.     Appeal Process: If you disagree with a written determination issued by the insurer or the insurer does not respond to your request, you may appeal to the Department of Administration, , by  following the instructions contained in your determination letter. You must appeal the determination within 70 days from the date of the determination letter at [address], [address], [place], [place] [address], or [address], [address], [place], [place] [address]. If you disagree with the  decision, you may appeal to the Department of Administration, . You must file your appeal within 30 days from the date of the  decision letter at [address], [address], [place], [place] [address], or [address], [address], [place], [place] [address]. If you disagree with a decision of an , you may file a petition for judicial review with the District Court. You must do so within 30 days of the Appeal Officer’s decision. You may be represented by an  at your own expense or you may contact the [facility] for possible representation.    [place]  for Injured Workers (NAIW): If you disagree with a  decision, you may request that NAIW represent you without charge at an  Hearing. For information regarding denial of benefits, you may contact the [facility] at: [address], [address], [place] [address], [phone], or [address], [address], [place] [address], [phone]    To File a Complaint with the Division: If you wish to file a complaint with the  of the Division of Industrial Relations (DIR),  please contact the Workers’ Compensation Section, [address], [address], [place], [place] [address], telephone [phone]) [phone], or [address], [address], [place], [place] [address], telephone [phone].    For assistance with Workers’ Compensation Issues: You may contact the [facility] Office for Consumer Health Assistance, [address], [address], [place], [place] [address], Toll Free 1-581.771.2309, Web site:  http://[facility].nv.gov/Steph/ANJEL E-mail: anjel@[facility].nv.gov  D-2 (rev. 10/20)              __________________________________________________________________                                    ___11/08/2021______________            Employee Name / Signature                                                                                                                            Date

## 2021-11-11 ENCOUNTER — OCCUPATIONAL MEDICINE (OUTPATIENT)
Dept: URGENT CARE | Facility: CLINIC | Age: 61
End: 2021-11-11
Payer: COMMERCIAL

## 2021-11-11 VITALS
DIASTOLIC BLOOD PRESSURE: 82 MMHG | RESPIRATION RATE: 14 BRPM | HEIGHT: 65 IN | WEIGHT: 146 LBS | HEART RATE: 95 BPM | BODY MASS INDEX: 24.32 KG/M2 | SYSTOLIC BLOOD PRESSURE: 136 MMHG | TEMPERATURE: 99.3 F | OXYGEN SATURATION: 97 %

## 2021-11-11 DIAGNOSIS — L24.5 IRRITANT CONTACT DERMATITIS DUE TO OTHER CHEMICAL PRODUCTS: ICD-10-CM

## 2021-11-11 DIAGNOSIS — R04.0 LEFT-SIDED EPISTAXIS: ICD-10-CM

## 2021-11-11 DIAGNOSIS — Z77.098 CHEMICAL EXPOSURE: ICD-10-CM

## 2021-11-11 PROCEDURE — 99213 OFFICE O/P EST LOW 20 MIN: CPT | Performed by: NURSE PRACTITIONER

## 2021-11-11 RX ORDER — HYDROXYZINE HYDROCHLORIDE 25 MG/1
25 TABLET, FILM COATED ORAL EVERY 6 HOURS PRN
Qty: 30 TABLET | Refills: 0 | Status: SHIPPED | OUTPATIENT
Start: 2021-11-11 | End: 2022-10-10

## 2021-11-11 RX ORDER — METHYLPREDNISOLONE 4 MG/1
TABLET ORAL
Qty: 1 EACH | Refills: 0 | Status: SHIPPED | OUTPATIENT
Start: 2021-11-11 | End: 2022-10-10

## 2021-11-11 ASSESSMENT — ENCOUNTER SYMPTOMS
CONSTITUTIONAL NEGATIVE: 1
SHORTNESS OF BREATH: 0

## 2021-11-11 ASSESSMENT — VISUAL ACUITY: OU: 1

## 2021-11-11 ASSESSMENT — FIBROSIS 4 INDEX: FIB4 SCORE: 1.32

## 2021-11-11 NOTE — LETTER
"   [facility]  [address] - [place] [address]  Phone:  [phone] - Fax:  [phone]   [facility] Progress Report and Disability Certification  Date of Service: 11/11/2021   No Show:  No  Date / Time of Next Visit: 11/15/2021 @ 4 pm   Claim Information   Patient Name: Kenneth Samuels  Claim Number:     Employer: [employer]  Date of Injury: 11/8/2021     Insurer / TPA: [employer]  ID / SSN:     Occupation:   Diagnosis: Diagnoses of Chemical exposure, Irritant contact dermatitis due to other chemical products, and Left-sided epistaxis were pertinent to this visit.    Medical Information   Related to Industrial Injury? Yes    Subjective Complaints:  Initial ER visit 11/8/2021 reviewed for continuity of care:    \"Kenneth Samuels is a 61 y.o. male who presents to the Emergency Department for evaluation after a chemical exposure onset today at work. 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 He has associated epistaxis and rash to his face. Patient was not wearing a mask for eye protection during his exposure. Denies shortness of breath.\"    Poison control was contacted.  Patient was decontaminated and showered.  His nosebleed was treated with phenylephrine and lidocaine with epinephrine 1% soaked gauze.  Started on Norco for pain as well as amoxicillin.  Was also started on hydrocortisone cream for the itchiness and a rash.  Patient reports nosebleed is controlled.  However, reports that rash and itchiness are not.  Denies other/new symptoms.  Has an appointment with occupational health on 11/15/2021.   Objective Findings: Constitutional:       General: He is not in acute distress.     Appearance: He is well-developed. He is not ill-appearing or toxic-appearing.   HENT:      Right Ear: External ear normal.      Left Ear: External ear normal.      Nose: Congestion and " rhinorrhea present. Rhinorrhea is clear.      Right Nostril: No epistaxis, septal hematoma or occlusion.      Left Nostril: No epistaxis, septal hematoma or occlusion.   Eyes:      General: Vision grossly intact.      Extraocular Movements: Extraocular movements intact.      Conjunctiva/sclera: Conjunctivae normal.   Pulmonary:      Effort: Pulmonary effort is normal. No respiratory distress.   Musculoskeletal:         General: No deformity. Normal range of motion.      Cervical back: Normal range of motion.   Skin:     General: Skin is warm and dry.      Coloration: Skin is not pale.      Findings: Rash present.      Comments: Erythematous, maculopapular rash on forearms, face, trunk   Neurological:      Mental Status: He is alert and oriented to person, place, and time.      Sensory: No sensory deficit.      Motor: No weakness.      Coordination: Coordination normal.   Pre-Existing Condition(s):     Assessment:   Condition Same    Status: Discharged / Care Transfer  Permanent Disability:No    Plan:      Diagnostics:      Comments:  Nasal packing removed.  Patient tolerated well.  No complications.  No further nosebleed.  Continue with all other medication as directed.  New prescription sent electronically for oral steroid and histamine to help with symptoms.  Atarax not for use   at work or while driving.  Patient reporting intense symptoms of itchiness impairing his ability to work.  Will place patient on TTD until his next follow-up appointment with occupational health on 11/15/2021.  Seek immediate medical attention if sy  mptoms change or worsen.    Disability Information   Status: Temporarily Totally Disabled    From:  11/11/2021  Through: 11/15/2021 Restrictions are: Temporary   Physical Restrictions   Sitting:    Standing:    Stooping:    Bending:      Squatting:    Walking:    Climbing:    Pushing:      Pulling:    Other:    Reaching Above Shoulder (L):   Reaching Above Shoulder (R):       Reaching Below  Shoulder (L):    Reaching Below Shoulder (R):      Not to exceed Weight Limits   Carrying(hrs):   Weight Limit(lb):   Lifting(hrs):   Weight  Limit(lb):     Comments:      Repetitive Actions   Hands: i.e. Fine Manipulations from Grasping:     Feet: i.e. Operating Foot Controls:     Driving / Operate Machinery:     Health Care Provider’s Original or Electronic Signature  SHONA Lowery Health Care Provider’s Original or Electronic Signature    Yung Chandra MD         Clinic Name / Location: [address]  [place] [address] Clinic Phone Number: Dept: [phone]   Appointment Time: 1:30 Pm Visit Start Time: 1:30 PM   Check-In Time:  1:29 Pm Visit Discharge Time: 2:10 PM   Original-Treating Physician or Chiropractor    Page 2-Insurer/TPA    Page 3-Employer    Page 4-Employee

## 2021-11-15 ENCOUNTER — OCCUPATIONAL MEDICINE (OUTPATIENT)
Dept: OCCUPATIONAL MEDICINE | Facility: CLINIC | Age: 61
End: 2021-11-15
Payer: COMMERCIAL

## 2021-11-15 VITALS
HEIGHT: 65 IN | HEART RATE: 109 BPM | TEMPERATURE: 97.4 F | BODY MASS INDEX: 24.32 KG/M2 | RESPIRATION RATE: 14 BRPM | SYSTOLIC BLOOD PRESSURE: 116 MMHG | OXYGEN SATURATION: 98 % | DIASTOLIC BLOOD PRESSURE: 72 MMHG | WEIGHT: 146 LBS

## 2021-11-15 DIAGNOSIS — L23.1 ALLERGIC CONTACT DERMATITIS DUE TO ADHESIVES: ICD-10-CM

## 2021-11-15 PROCEDURE — 99203 OFFICE O/P NEW LOW 30 MIN: CPT | Performed by: PREVENTIVE MEDICINE

## 2021-11-15 ASSESSMENT — FIBROSIS 4 INDEX: FIB4 SCORE: 1.32

## 2021-11-15 NOTE — LETTER
[address],   [address] [place] [address]  Phone:  [phone] - Fax:  [phone]   [facility] Progress Report and Disability Certification  Date of Service: 11/15/2021   No Show:  No  Date / Time of Next Visit: 11/29/2021 @ 11:30am   Claim Information   Patient Name: Kenneth Samuels  Claim Number:     Employer: [employer]  Date of Injury: 11/8/2021     Insurer / TPA: [employer]  ID / SSN:     Occupation:   Diagnosis: The encounter diagnosis was Allergic contact dermatitis due to adhesives.    Medical Information   Related to Industrial Injury? 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 He has associated epistaxis and rash to his face. Patient was not wearing a mask for eye protection during his exposure.  He was seen in the ER, poison control was contacted, recommended decontamination.  Epistaxis were treated as well as dermatitis.  He followed up in urgent care epistaxis had resolved but continued to have rash and itchiness he is given hydroxyzine and oral steroid.  Patient states that the blindness continues to be improved.  He states that he does continue with some itchiness and rash is overall improving but still has some areas on his hands, arms, abdomen and face.  He is taking hydroxyzine, amoxicillin, Medrol Dosepak and has hydrocortisone cream.  Denies prior history of similar symptoms.  He has worked in this establishment for 1 year.   Objective Findings: Skin: Areas of maculopapular rash over the elbow web spaces of the finger, eyebrows and chin as well as lower abdomen.  Some evidence of excoriation.  No open wounds.   Pre-Existing Condition(s):     Assessment:   Condition Same    Status: Additional Care Required  Permanent  Disability:No    Plan:      Diagnostics:      Comments:  Symptoms most consistent with allergic dermatitis  Continue Medrol Dosepak, hydroxyzine and hydrocortisone cream  Restricted duty  Follow-up 2 weeks    Disability Information   Status: Released to Restricted Duty    From:  11/15/2021  Through: 11/29/2021 Restrictions are: Temporary   Physical Restrictions   Sitting:    Standing:    Stooping:    Bending:      Squatting:    Walking:    Climbing:    Pushing:      Pulling:    Other:    Reaching Above Shoulder (L):   Reaching Above Shoulder (R):       Reaching Below Shoulder (L):    Reaching Below Shoulder (R):      Not to exceed Weight Limits   Carrying(hrs):   Weight Limit(lb):   Lifting(hrs):   Weight  Limit(lb):     Comments: No exposure to or working with Duralco 128 or similar epoxy/adhesive    Repetitive Actions   Hands: i.e. Fine Manipulations from Grasping:     Feet: i.e. Operating Foot Controls:     Driving / Operate Machinery:     Health Care Provider’s Original or Electronic Signature  Ralph Dhaliwal D.O. Health Care Provider’s Original or Electronic Signature    Yung Chandra MD         Clinic Name / Location: [address] [address] Clinic Phone Number: Dept: [phone]   Appointment Time: 3:45 Pm Visit Start Time: 3:32 PM   Check-In Time:  3:26 Pm Visit Discharge Time:  3:54pm   Original-Treating Physician or Chiropractor    Page 2-Insurer/TPA    Page 3-Employer    Page 4-Employee

## 2021-11-15 NOTE — PROGRESS NOTES
"Subjective:     Kenneth Samuels is a 61 y.o. male who presents for Follow-Up (WC f/v, full body rash WORSE - RM 16)      DOI 11/8/2021: 61-year-old injured worker presents with dermatitis/allergic reaction.  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 He has associated epistaxis and rash to his face. Patient was not wearing a mask for eye protection during his exposure.  He was seen in the ER, poison control was contacted, recommended decontamination.  Epistaxis were treated as well as dermatitis.  He followed up in urgent care epistaxis had resolved but continued to have rash and itchiness he is given hydroxyzine and oral steroid.  Patient states that the blindness continues to be improved.  He states that he does continue with some itchiness and rash is overall improving but still has some areas on his hands, arms, abdomen and face.  He is taking hydroxyzine, amoxicillin, Medrol Dosepak and has hydrocortisone cream.  Denies prior history of similar symptoms.  He has worked in this establishment for 1 year.    ROS: All systems were reviewed on intake form, form was reviewed and signed. See scanned documents in media. Pertinent positives and negatives included in HPI.    PMH: No pertinent past medical history to this problem  MEDS: Medications were reviewed in Epic  ALLERGIES: No Known Allergies  SOCHX: Works as an  at [employer] for one year  FH: No pertinent family history to this problem       Objective:     /72   Pulse (!) 109   Temp 36.3 °C (97.4 °F) (Temporal)   Resp 14   Ht 1.651 m (5' 5\")   Wt 66.2 kg (146 lb)   SpO2 98%   BMI 24.30 kg/m²     Constitutional: Patient is in no acute distress. Appears well-developed and well-nourished.   HENT: Normocephalic and atraumatic. EOM are normal. No scleral icterus.   Cardiovascular: Normal rate.    Pulmonary/Chest: Effort normal. No respiratory distress.   Neurological: Patient is alert " and oriented to person, place, and time.   Skin: Skin is warm and dry.   Psychiatric: Normal mood and affect. Behavior is normal.     Skin: Areas of maculopapular rash over the elbow web spaces of the finger, eyebrows and chin as well as lower abdomen.  Some evidence of excoriation.  No open wounds.    Assessment/Plan:       1. Allergic contact dermatitis due to adhesives    Released to Restricted Duty FROM 11/15/2021 TO 11/29/2021  No exposure to or working with Duralco 128 or similar epoxy/adhesive  Symptoms most consistent with allergic dermatitis  Continue Medrol Dosepak, hydroxyzine and hydrocortisone cream  Restricted duty  Follow-up 2 weeks    Differential diagnosis, natural history, supportive care, and indications for immediate follow-up discussed.    Approximately 35 minutes were spent in reviewing notes, preparing for visit, obtaining history, exam and evaluation, patient counseling/education and post visit documentation/orders.

## 2021-11-29 ENCOUNTER — OCCUPATIONAL MEDICINE (OUTPATIENT)
Dept: OCCUPATIONAL MEDICINE | Facility: CLINIC | Age: 61
End: 2021-11-29
Payer: COMMERCIAL

## 2021-11-29 VITALS
BODY MASS INDEX: 24.16 KG/M2 | WEIGHT: 145 LBS | SYSTOLIC BLOOD PRESSURE: 120 MMHG | HEIGHT: 65 IN | TEMPERATURE: 98.5 F | OXYGEN SATURATION: 95 % | DIASTOLIC BLOOD PRESSURE: 80 MMHG | HEART RATE: 84 BPM

## 2021-11-29 DIAGNOSIS — L23.1 ALLERGIC CONTACT DERMATITIS DUE TO ADHESIVES: ICD-10-CM

## 2021-11-29 PROCEDURE — 99212 OFFICE O/P EST SF 10 MIN: CPT | Performed by: PREVENTIVE MEDICINE

## 2021-11-29 ASSESSMENT — FIBROSIS 4 INDEX: FIB4 SCORE: 1.32

## 2021-11-29 NOTE — PROGRESS NOTES
"Subjective:     Kenneth Samuels is a 61 y.o. 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 He has associated epistaxis and rash to his face.  Patient states overall symptoms are improved significantly.  He states only gets occasional itchiness on random spots on the leg and arm.  But no overt rash.  He states they change his job duties and does not go into the department the chemicals anymore or really interact with that staff.    ROS         Objective:     /80   Pulse 84   Temp 36.9 °C (98.5 °F)   Ht 1.651 m (5' 5\")   Wt 65.8 kg (145 lb)   SpO2 95%   BMI 24.13 kg/m²     Constitutional: Patient is in no acute distress. Appears well-developed and well-nourished.   Cardiovascular: Normal rate.    Pulmonary/Chest: Effort normal. No respiratory distress.   Neurological: Patient is alert and oriented to person, place, and time.   Skin: Skin is warm and dry.   Psychiatric: Normal mood and affect. Behavior is normal.     Skin: No areas of active rash.    Assessment/Plan:       1. Allergic contact dermatitis due to adhesives    Released to Full Duty FROM 11/29/2021 TO       N released from care  Full duty  Follow-up as needed    Differential diagnosis, natural history, supportive care, and indications for immediate follow-up discussed.    Approximately 15 minutes was spent in preparing for visit, obtaining history, exam and evaluation, patient counseling/education and post visit documentation/orders.  "

## 2021-11-29 NOTE — LETTER
[address],   [address] [place] [address]  Phone:  [phone] - Fax:  [phone]   Occupational Health [facility] Progress Report and Disability Certification  Date of Service: 11/29/2021   No Show:  No  Date / Time of Next Visit:  Release from care   Claim Information   Patient Name: Kenneth Samuels  Claim Number:     Employer: [employer]  Date of Injury: 11/8/2021     Insurer / TPA: [employer]  ID / SSN:     Occupation:   Diagnosis: The encounter diagnosis was Allergic contact dermatitis due to adhesives.    Medical Information   Related to Industrial Injury? 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 He has associated epistaxis and rash to his face.  Patient states overall symptoms are improved significantly.  He states only gets occasional itchiness on random spots on the leg and arm.  But no overt rash.  He states they change his job duties and does not go into the department the chemicals anymore or really interact with that staff.   Objective Findings: Skin: No areas of active rash.   Pre-Existing Condition(s):     Assessment:   Condition Same    Status: Additional Care Required  Permanent Disability:No    Plan:      Diagnostics:      Comments:  N released from care  Full duty  Follow-up as needed    Disability Information   Status: Released to Full Duty    From:  11/29/2021  Through:   Restrictions are:     Physical Restrictions   Sitting:    Standing:    Stooping:    Bending:      Squatting:    Walking:    Climbing:    Pushing:      Pulling:    Other:    Reaching Above Shoulder (L):   Reaching Above Shoulder (R):       Reaching Below Shoulder (L):    Reaching Below Shoulder (R):      Not to exceed Weight Limits   Carrying(hrs):   Weight Limit(lb):    Lifting(hrs):   Weight  Limit(lb):     Comments:      Repetitive Actions   Hands: i.e. Fine Manipulations from Grasping:     Feet: i.e. Operating Foot Controls:     Driving / Operate Machinery:     Health Care Provider’s Original or Electronic Signature  Ralph Dhaliwal D.O. Health Care Provider’s Original or Electronic Signature    Yung Chandra MD         Clinic Name / Location: [address]   [address] [place] [id number] Clinic Phone Number: Dept: [phone]   Appointment Time: 11:30 Am Visit Start Time: 11:32 AM   Check-In Time:  11:20 Am Visit Discharge Time:  11:50am   Original-Treating Physician or Chiropractor    Page 2-Insurer/TPA    Page 3-Employer    Page 4-Employee

## 2022-08-17 ENCOUNTER — HOSPITAL ENCOUNTER (OUTPATIENT)
Dept: LAB | Facility: MEDICAL CENTER | Age: 62
End: 2022-08-17
Attending: FAMILY MEDICINE
Payer: COMMERCIAL

## 2022-08-17 DIAGNOSIS — Z00.00 PE (PHYSICAL EXAM), ANNUAL: ICD-10-CM

## 2022-08-17 LAB
ALBUMIN SERPL BCP-MCNC: 4.9 G/DL (ref 3.2–4.9)
ALBUMIN/GLOB SERPL: 1.8 G/DL
ALP SERPL-CCNC: 52 U/L (ref 30–99)
ALT SERPL-CCNC: 10 U/L (ref 2–50)
ANION GAP SERPL CALC-SCNC: 10 MMOL/L (ref 7–16)
AST SERPL-CCNC: 17 U/L (ref 12–45)
BASOPHILS # BLD AUTO: 1.1 % (ref 0–1.8)
BASOPHILS # BLD: 0.05 K/UL (ref 0–0.12)
BILIRUB SERPL-MCNC: 1.4 MG/DL (ref 0.1–1.5)
BUN SERPL-MCNC: 20 MG/DL (ref 8–22)
CALCIUM SERPL-MCNC: 9.6 MG/DL (ref 8.5–10.5)
CHLORIDE SERPL-SCNC: 102 MMOL/L (ref 96–112)
CHOLEST SERPL-MCNC: 196 MG/DL (ref 100–199)
CO2 SERPL-SCNC: 27 MMOL/L (ref 20–33)
CREAT SERPL-MCNC: 0.75 MG/DL (ref 0.5–1.4)
EOSINOPHIL # BLD AUTO: 0.3 K/UL (ref 0–0.51)
EOSINOPHIL NFR BLD: 6.3 % (ref 0–6.9)
ERYTHROCYTE [DISTWIDTH] IN BLOOD BY AUTOMATED COUNT: 42.5 FL (ref 35.9–50)
EST. AVERAGE GLUCOSE BLD GHB EST-MCNC: 114 MG/DL
FASTING STATUS PATIENT QL REPORTED: NORMAL
GFR SERPLBLD CREATININE-BSD FMLA CKD-EPI: 102 ML/MIN/1.73 M 2
GLOBULIN SER CALC-MCNC: 2.7 G/DL (ref 1.9–3.5)
GLUCOSE SERPL-MCNC: 93 MG/DL (ref 65–99)
HBA1C MFR BLD: 5.6 % (ref 4–5.6)
HCT VFR BLD AUTO: 49.3 % (ref 42–52)
HDLC SERPL-MCNC: 52 MG/DL
HGB BLD-MCNC: 16.4 G/DL (ref 14–18)
IMM GRANULOCYTES # BLD AUTO: 0.01 K/UL (ref 0–0.11)
IMM GRANULOCYTES NFR BLD AUTO: 0.2 % (ref 0–0.9)
LDLC SERPL CALC-MCNC: 124 MG/DL
LYMPHOCYTES # BLD AUTO: 1.95 K/UL (ref 1–4.8)
LYMPHOCYTES NFR BLD: 41.1 % (ref 22–41)
MCH RBC QN AUTO: 31.4 PG (ref 27–33)
MCHC RBC AUTO-ENTMCNC: 33.3 G/DL (ref 33.7–35.3)
MCV RBC AUTO: 94.3 FL (ref 81.4–97.8)
MONOCYTES # BLD AUTO: 0.4 K/UL (ref 0–0.85)
MONOCYTES NFR BLD AUTO: 8.4 % (ref 0–13.4)
NEUTROPHILS # BLD AUTO: 2.03 K/UL (ref 1.82–7.42)
NEUTROPHILS NFR BLD: 42.9 % (ref 44–72)
NRBC # BLD AUTO: 0 K/UL
NRBC BLD-RTO: 0 /100 WBC
PLATELET # BLD AUTO: 242 K/UL (ref 164–446)
PMV BLD AUTO: 10.1 FL (ref 9–12.9)
POTASSIUM SERPL-SCNC: 4.5 MMOL/L (ref 3.6–5.5)
PROT SERPL-MCNC: 7.6 G/DL (ref 6–8.2)
RBC # BLD AUTO: 5.23 M/UL (ref 4.7–6.1)
SODIUM SERPL-SCNC: 139 MMOL/L (ref 135–145)
TRIGL SERPL-MCNC: 99 MG/DL (ref 0–149)
WBC # BLD AUTO: 4.7 K/UL (ref 4.8–10.8)

## 2022-08-17 PROCEDURE — 83036 HEMOGLOBIN GLYCOSYLATED A1C: CPT

## 2022-08-17 PROCEDURE — 85025 COMPLETE CBC W/AUTO DIFF WBC: CPT

## 2022-08-17 PROCEDURE — 36415 COLL VENOUS BLD VENIPUNCTURE: CPT

## 2022-08-17 PROCEDURE — 80061 LIPID PANEL: CPT

## 2022-08-17 PROCEDURE — 80053 COMPREHEN METABOLIC PANEL: CPT

## 2022-10-10 ENCOUNTER — OFFICE VISIT (OUTPATIENT)
Dept: MEDICAL GROUP | Age: 62
End: 2022-10-10
Payer: COMMERCIAL

## 2022-10-10 VITALS
WEIGHT: 141 LBS | HEART RATE: 72 BPM | BODY MASS INDEX: 23.49 KG/M2 | OXYGEN SATURATION: 98 % | DIASTOLIC BLOOD PRESSURE: 68 MMHG | SYSTOLIC BLOOD PRESSURE: 116 MMHG | HEIGHT: 65 IN | TEMPERATURE: 97.5 F

## 2022-10-10 DIAGNOSIS — M21.70 LEG LENGTH DISCREPANCY: ICD-10-CM

## 2022-10-10 DIAGNOSIS — Z23 NEED FOR VACCINATION: ICD-10-CM

## 2022-10-10 DIAGNOSIS — Q67.5 CONGENITAL SCOLIOSIS: ICD-10-CM

## 2022-10-10 DIAGNOSIS — R73.01 IMPAIRED FASTING BLOOD SUGAR: ICD-10-CM

## 2022-10-10 DIAGNOSIS — Z00.00 PE (PHYSICAL EXAM), ANNUAL: Primary | ICD-10-CM

## 2022-10-10 DIAGNOSIS — N40.1 BENIGN PROSTATIC HYPERPLASIA WITH URINARY FREQUENCY: ICD-10-CM

## 2022-10-10 DIAGNOSIS — R35.0 BENIGN PROSTATIC HYPERPLASIA WITH URINARY FREQUENCY: ICD-10-CM

## 2022-10-10 DIAGNOSIS — M54.15 RADICULOPATHY OF THORACOLUMBAR REGION: ICD-10-CM

## 2022-10-10 DIAGNOSIS — E78.00 PURE HYPERCHOLESTEROLEMIA: ICD-10-CM

## 2022-10-10 PROCEDURE — 90472 IMMUNIZATION ADMIN EACH ADD: CPT | Performed by: FAMILY MEDICINE

## 2022-10-10 PROCEDURE — 90715 TDAP VACCINE 7 YRS/> IM: CPT | Performed by: FAMILY MEDICINE

## 2022-10-10 PROCEDURE — 99396 PREV VISIT EST AGE 40-64: CPT | Mod: 25 | Performed by: FAMILY MEDICINE

## 2022-10-10 PROCEDURE — 90686 IIV4 VACC NO PRSV 0.5 ML IM: CPT | Performed by: FAMILY MEDICINE

## 2022-10-10 PROCEDURE — 90471 IMMUNIZATION ADMIN: CPT | Performed by: FAMILY MEDICINE

## 2022-10-10 ASSESSMENT — FIBROSIS 4 INDEX: FIB4 SCORE: 1.38

## 2022-10-10 ASSESSMENT — PATIENT HEALTH QUESTIONNAIRE - PHQ9: CLINICAL INTERPRETATION OF PHQ2 SCORE: 0

## 2022-10-10 NOTE — PROGRESS NOTES
"Subjective:   CC: annual PE     HPI:     Kenneth Samuels is a 62 y.o. male, established patient of the clinic.     Patient is overall healthy, no major medical or surgical history.  Patient has mild impaired fasting blood sugar and hyperlipidemia that does not require treatment.  He tries to modify his diet and lifestyle to control this condition.  Patient has chronic congenital scoliosis of the thoracolumbar spine status post surgeries x2.  He continues to have chronic, low-grade low back pain with radiculopathy and leg length discrepancy.  He wears customized shoes.  He managed his symptoms through behavioral modification.  He is able to maintain full-time employment.  He rarely requires pain medication.  Patient is feeling well, he does not have any acute concerns today.    He was previously diagnosed with BPH.  He tried tamsulosin 0.4 mg a few times, but discontinued this medication shortly after due to improvement of symptoms.  He does not have any urinary concerns today.    Current medicines (including changes today)  Current Outpatient Medications   Medication Sig Dispense Refill    Ascorbic Acid (VITAMIN C PO) Take  by mouth.       No current facility-administered medications for this visit.     He  has no past medical history of ASTHMA, COPD, or Diabetes.    I reviewed patient's problem list, allergies, medications, family hx, social hx with patient and update EPIC.        Objective:     /68 (BP Location: Right arm, Patient Position: Sitting, BP Cuff Size: Small adult)   Pulse 72   Temp 36.4 °C (97.5 °F) (Temporal)   Ht 1.651 m (5' 5\")   Wt 64 kg (141 lb)   SpO2 98%  Body mass index is 23.46 kg/m².    Physical Exam:  Constitutional: awake, alert, in no distress.  Skin: Warm, dry, good turgor, no rashes, bruises, ulcers in visible areas.  Eye: conjunctiva clear, lids neg for edema or lesions.  ENMT: TM and auditory canals wnl.    Neck: Trachea midline, no masses, no thyromegaly. No cervical or " supraclavicular lymphadenopathy  Respiratory: Unlabored respiratory effort, lungs clear to auscultation, no wheezes, no rales.  Cardiovascular: Normal S1, S2, no murmur, no pedal edema.  Abdomen: Soft, non-tender to palpation, active BS, no hernia, no hepatosplenomegaly, negative rebound or guarding.   Psych: Oriented x3, affect and mood wnl, intact judgement and insight.       Assessment and Plan:   The following treatment plan was discussed    1. Impaired fasting blood sugar  - Dietary/lifestyle modification and maintain healthy body weight  - HEMOGLOBIN A1C; Future    2. Pure hypercholesterolemia  Chronic, low 10-year CVD risk, recommended dietary and lifestyle modification, brief dietary counseling provided, will continue to monitor.    - CBC WITH DIFFERENTIAL; Future  - Comp Metabolic Panel; Future  - Lipid Profile; Future    3. Benign prostatic hyperplasia with urinary frequency  Mild, does not require treatment.  We will continue to monitor.    4. Congenital scoliosis_throcolumbar_s/p 2 surgeries  5. Radiculopathy of thoracolumbar region  6. Leg length discrepancy  Doing well, manages symptoms conservatively  Will continue to monitor.     7. Need for vaccination  - Tdap Vaccine =>8YO IM  - INFLUENZA VACCINE QUAD INJ (PF)    8. PE (physical exam), annual  General health and wellness counseling provided.      Patient will get shingle and COVID booster at local pharmacies.      Mily Nunez M.D.      Followup: Return in about 1 year (around 10/10/2023) for annual PE.    Please note that this dictation was created using voice recognition software. I have made every reasonable attempt to correct obvious errors, but I expect that there are errors of grammar and possibly content that I did not discover before finalizing the note.

## 2023-09-20 ENCOUNTER — HOSPITAL ENCOUNTER (OUTPATIENT)
Dept: LAB | Facility: MEDICAL CENTER | Age: 63
End: 2023-09-20
Attending: FAMILY MEDICINE
Payer: COMMERCIAL

## 2023-09-20 DIAGNOSIS — E78.00 PURE HYPERCHOLESTEROLEMIA: ICD-10-CM

## 2023-09-20 DIAGNOSIS — R73.01 IMPAIRED FASTING BLOOD SUGAR: ICD-10-CM

## 2023-09-20 LAB
ALBUMIN SERPL BCP-MCNC: 4.3 G/DL (ref 3.2–4.9)
ALBUMIN/GLOB SERPL: 1.8 G/DL
ALP SERPL-CCNC: 45 U/L (ref 30–99)
ALT SERPL-CCNC: 13 U/L (ref 2–50)
ANION GAP SERPL CALC-SCNC: 12 MMOL/L (ref 7–16)
AST SERPL-CCNC: 15 U/L (ref 12–45)
BASOPHILS # BLD AUTO: 1.9 % (ref 0–1.8)
BASOPHILS # BLD: 0.08 K/UL (ref 0–0.12)
BILIRUB SERPL-MCNC: 0.9 MG/DL (ref 0.1–1.5)
BUN SERPL-MCNC: 17 MG/DL (ref 8–22)
CALCIUM ALBUM COR SERPL-MCNC: 9 MG/DL (ref 8.5–10.5)
CALCIUM SERPL-MCNC: 9.2 MG/DL (ref 8.4–10.2)
CHLORIDE SERPL-SCNC: 102 MMOL/L (ref 96–112)
CHOLEST SERPL-MCNC: 172 MG/DL (ref 100–199)
CO2 SERPL-SCNC: 26 MMOL/L (ref 20–33)
CREAT SERPL-MCNC: 0.78 MG/DL (ref 0.5–1.4)
EOSINOPHIL # BLD AUTO: 0.52 K/UL (ref 0–0.51)
EOSINOPHIL NFR BLD: 12.4 % (ref 0–6.9)
ERYTHROCYTE [DISTWIDTH] IN BLOOD BY AUTOMATED COUNT: 41.8 FL (ref 35.9–50)
EST. AVERAGE GLUCOSE BLD GHB EST-MCNC: 111 MG/DL
FASTING STATUS PATIENT QL REPORTED: NORMAL
GFR SERPLBLD CREATININE-BSD FMLA CKD-EPI: 100 ML/MIN/1.73 M 2
GLOBULIN SER CALC-MCNC: 2.4 G/DL (ref 1.9–3.5)
GLUCOSE SERPL-MCNC: 101 MG/DL (ref 65–99)
HBA1C MFR BLD: 5.5 % (ref 4–5.6)
HCT VFR BLD AUTO: 45.8 % (ref 42–52)
HDLC SERPL-MCNC: 47 MG/DL
HGB BLD-MCNC: 15.2 G/DL (ref 14–18)
IMM GRANULOCYTES # BLD AUTO: 0.01 K/UL (ref 0–0.11)
IMM GRANULOCYTES NFR BLD AUTO: 0.2 % (ref 0–0.9)
LDLC SERPL CALC-MCNC: 105 MG/DL
LYMPHOCYTES # BLD AUTO: 1.71 K/UL (ref 1–4.8)
LYMPHOCYTES NFR BLD: 40.6 % (ref 22–41)
MCH RBC QN AUTO: 31.6 PG (ref 27–33)
MCHC RBC AUTO-ENTMCNC: 33.2 G/DL (ref 32.3–36.5)
MCV RBC AUTO: 95.2 FL (ref 81.4–97.8)
MONOCYTES # BLD AUTO: 0.32 K/UL (ref 0–0.85)
MONOCYTES NFR BLD AUTO: 7.6 % (ref 0–13.4)
NEUTROPHILS # BLD AUTO: 1.57 K/UL (ref 1.82–7.42)
NEUTROPHILS NFR BLD: 37.3 % (ref 44–72)
NRBC # BLD AUTO: 0 K/UL
NRBC BLD-RTO: 0 /100 WBC (ref 0–0.2)
PLATELET # BLD AUTO: 234 K/UL (ref 164–446)
PMV BLD AUTO: 9.3 FL (ref 9–12.9)
POTASSIUM SERPL-SCNC: 4.7 MMOL/L (ref 3.6–5.5)
PROT SERPL-MCNC: 6.7 G/DL (ref 6–8.2)
RBC # BLD AUTO: 4.81 M/UL (ref 4.7–6.1)
SODIUM SERPL-SCNC: 140 MMOL/L (ref 135–145)
TRIGL SERPL-MCNC: 100 MG/DL (ref 0–149)
WBC # BLD AUTO: 4.2 K/UL (ref 4.8–10.8)

## 2023-09-20 PROCEDURE — 36415 COLL VENOUS BLD VENIPUNCTURE: CPT

## 2023-09-20 PROCEDURE — 80061 LIPID PANEL: CPT

## 2023-09-20 PROCEDURE — 83036 HEMOGLOBIN GLYCOSYLATED A1C: CPT

## 2023-09-20 PROCEDURE — 80053 COMPREHEN METABOLIC PANEL: CPT

## 2023-09-20 PROCEDURE — 85025 COMPLETE CBC W/AUTO DIFF WBC: CPT

## 2023-12-02 SDOH — ECONOMIC STABILITY: TRANSPORTATION INSECURITY
IN THE PAST 12 MONTHS, HAS LACK OF RELIABLE TRANSPORTATION KEPT YOU FROM MEDICAL APPOINTMENTS, MEETINGS, WORK OR FROM GETTING THINGS NEEDED FOR DAILY LIVING?: PATIENT DECLINED

## 2023-12-02 SDOH — ECONOMIC STABILITY: INCOME INSECURITY: IN THE LAST 12 MONTHS, WAS THERE A TIME WHEN YOU WERE NOT ABLE TO PAY THE MORTGAGE OR RENT ON TIME?: PATIENT REFUSED

## 2023-12-02 SDOH — ECONOMIC STABILITY: TRANSPORTATION INSECURITY
IN THE PAST 12 MONTHS, HAS THE LACK OF TRANSPORTATION KEPT YOU FROM MEDICAL APPOINTMENTS OR FROM GETTING MEDICATIONS?: PATIENT DECLINED

## 2023-12-02 SDOH — ECONOMIC STABILITY: HOUSING INSECURITY
IN THE LAST 12 MONTHS, WAS THERE A TIME WHEN YOU DID NOT HAVE A STEADY PLACE TO SLEEP OR SLEPT IN A SHELTER (INCLUDING NOW)?: PATIENT REFUSED

## 2023-12-02 SDOH — ECONOMIC STABILITY: INCOME INSECURITY: HOW HARD IS IT FOR YOU TO PAY FOR THE VERY BASICS LIKE FOOD, HOUSING, MEDICAL CARE, AND HEATING?: VERY HARD

## 2023-12-02 SDOH — ECONOMIC STABILITY: FOOD INSECURITY: WITHIN THE PAST 12 MONTHS, THE FOOD YOU BOUGHT JUST DIDN'T LAST AND YOU DIDN'T HAVE MONEY TO GET MORE.: PATIENT DECLINED

## 2023-12-02 SDOH — ECONOMIC STABILITY: FOOD INSECURITY: WITHIN THE PAST 12 MONTHS, YOU WORRIED THAT YOUR FOOD WOULD RUN OUT BEFORE YOU GOT MONEY TO BUY MORE.: PATIENT DECLINED

## 2023-12-02 SDOH — ECONOMIC STABILITY: HOUSING INSECURITY

## 2023-12-02 SDOH — HEALTH STABILITY: PHYSICAL HEALTH: ON AVERAGE, HOW MANY DAYS PER WEEK DO YOU ENGAGE IN MODERATE TO STRENUOUS EXERCISE (LIKE A BRISK WALK)?: 7 DAYS

## 2023-12-02 SDOH — HEALTH STABILITY: PHYSICAL HEALTH

## 2023-12-02 SDOH — HEALTH STABILITY: MENTAL HEALTH

## 2023-12-02 SDOH — ECONOMIC STABILITY: TRANSPORTATION INSECURITY
IN THE PAST 12 MONTHS, HAS LACK OF TRANSPORTATION KEPT YOU FROM MEETINGS, WORK, OR FROM GETTING THINGS NEEDED FOR DAILY LIVING?: PATIENT DECLINED

## 2023-12-02 ASSESSMENT — SOCIAL DETERMINANTS OF HEALTH (SDOH)
HOW OFTEN DO YOU ATTEND CHURCH OR RELIGIOUS SERVICES?: MORE THAN 4 TIMES PER YEAR
HOW OFTEN DO YOU HAVE A DRINK CONTAINING ALCOHOL: NEVER
DO YOU BELONG TO ANY CLUBS OR ORGANIZATIONS SUCH AS CHURCH GROUPS UNIONS, FRATERNAL OR ATHLETIC GROUPS, OR SCHOOL GROUPS?: PATIENT DECLINED
HOW OFTEN DO YOU ATTEND CHURCH OR RELIGIOUS SERVICES?: MORE THAN 4 TIMES PER YEAR
IN A TYPICAL WEEK, HOW MANY TIMES DO YOU TALK ON THE PHONE WITH FAMILY, FRIENDS, OR NEIGHBORS?: PATIENT DECLINED
HOW OFTEN DO YOU ATTENT MEETINGS OF THE CLUB OR ORGANIZATION YOU BELONG TO?: PATIENT DECLINED
HOW HARD IS IT FOR YOU TO PAY FOR THE VERY BASICS LIKE FOOD, HOUSING, MEDICAL CARE, AND HEATING?: VERY HARD
HOW OFTEN DO YOU GET TOGETHER WITH FRIENDS OR RELATIVES?: MORE THAN THREE TIMES A WEEK
WITHIN THE PAST 12 MONTHS, YOU WORRIED THAT YOUR FOOD WOULD RUN OUT BEFORE YOU GOT THE MONEY TO BUY MORE: PATIENT DECLINED
DO YOU BELONG TO ANY CLUBS OR ORGANIZATIONS SUCH AS CHURCH GROUPS UNIONS, FRATERNAL OR ATHLETIC GROUPS, OR SCHOOL GROUPS?: PATIENT DECLINED
HOW OFTEN DO YOU HAVE SIX OR MORE DRINKS ON ONE OCCASION: NEVER
IN A TYPICAL WEEK, HOW MANY TIMES DO YOU TALK ON THE PHONE WITH FAMILY, FRIENDS, OR NEIGHBORS?: PATIENT DECLINED
HOW MANY DRINKS CONTAINING ALCOHOL DO YOU HAVE ON A TYPICAL DAY WHEN YOU ARE DRINKING: PATIENT DOES NOT DRINK
HOW OFTEN DO YOU GET TOGETHER WITH FRIENDS OR RELATIVES?: MORE THAN THREE TIMES A WEEK
HOW OFTEN DO YOU ATTENT MEETINGS OF THE CLUB OR ORGANIZATION YOU BELONG TO?: PATIENT DECLINED

## 2023-12-02 ASSESSMENT — LIFESTYLE VARIABLES
HOW OFTEN DO YOU HAVE A DRINK CONTAINING ALCOHOL: NEVER
HOW OFTEN DO YOU HAVE SIX OR MORE DRINKS ON ONE OCCASION: NEVER
HOW MANY STANDARD DRINKS CONTAINING ALCOHOL DO YOU HAVE ON A TYPICAL DAY: PATIENT DOES NOT DRINK
SKIP TO QUESTIONS 9-10: 1
AUDIT-C TOTAL SCORE: 0

## 2023-12-05 ENCOUNTER — OFFICE VISIT (OUTPATIENT)
Dept: MEDICAL GROUP | Facility: MEDICAL CENTER | Age: 63
End: 2023-12-05
Payer: COMMERCIAL

## 2023-12-05 VITALS
SYSTOLIC BLOOD PRESSURE: 124 MMHG | HEIGHT: 65 IN | HEART RATE: 68 BPM | DIASTOLIC BLOOD PRESSURE: 80 MMHG | OXYGEN SATURATION: 97 % | TEMPERATURE: 98.8 F | WEIGHT: 139.99 LBS | BODY MASS INDEX: 23.32 KG/M2

## 2023-12-05 DIAGNOSIS — M21.70 LEG LENGTH DISCREPANCY: ICD-10-CM

## 2023-12-05 DIAGNOSIS — Z00.00 PE (PHYSICAL EXAM), ANNUAL: Primary | ICD-10-CM

## 2023-12-05 DIAGNOSIS — M54.15 RADICULOPATHY OF THORACOLUMBAR REGION: ICD-10-CM

## 2023-12-05 DIAGNOSIS — E78.00 PURE HYPERCHOLESTEROLEMIA: ICD-10-CM

## 2023-12-05 DIAGNOSIS — F40.243 ANXIETY WITH FLYING: ICD-10-CM

## 2023-12-05 DIAGNOSIS — R35.0 BENIGN PROSTATIC HYPERPLASIA WITH URINARY FREQUENCY: ICD-10-CM

## 2023-12-05 DIAGNOSIS — H65.93 MIDDLE EAR EFFUSION, BILATERAL: ICD-10-CM

## 2023-12-05 DIAGNOSIS — Z11.4 ENCOUNTER FOR SCREENING FOR HIV: ICD-10-CM

## 2023-12-05 DIAGNOSIS — Q67.5 CONGENITAL SCOLIOSIS: ICD-10-CM

## 2023-12-05 DIAGNOSIS — N40.1 BENIGN PROSTATIC HYPERPLASIA WITH URINARY FREQUENCY: ICD-10-CM

## 2023-12-05 DIAGNOSIS — Z23 NEED FOR VACCINATION: ICD-10-CM

## 2023-12-05 PROCEDURE — 99396 PREV VISIT EST AGE 40-64: CPT | Mod: 25 | Performed by: FAMILY MEDICINE

## 2023-12-05 PROCEDURE — 90686 IIV4 VACC NO PRSV 0.5 ML IM: CPT | Performed by: FAMILY MEDICINE

## 2023-12-05 PROCEDURE — 99214 OFFICE O/P EST MOD 30 MIN: CPT | Mod: 25 | Performed by: FAMILY MEDICINE

## 2023-12-05 PROCEDURE — 90471 IMMUNIZATION ADMIN: CPT | Performed by: FAMILY MEDICINE

## 2023-12-05 PROCEDURE — 3074F SYST BP LT 130 MM HG: CPT | Performed by: FAMILY MEDICINE

## 2023-12-05 PROCEDURE — 3079F DIAST BP 80-89 MM HG: CPT | Performed by: FAMILY MEDICINE

## 2023-12-05 RX ORDER — METHYLPREDNISOLONE 4 MG/1
TABLET ORAL
Qty: 21 TABLET | Refills: 0 | Status: SHIPPED | OUTPATIENT
Start: 2023-12-05 | End: 2024-01-04

## 2023-12-05 RX ORDER — ALPRAZOLAM 0.25 MG/1
0.25 TABLET ORAL NIGHTLY PRN
Qty: 30 TABLET | Refills: 0 | Status: SHIPPED | OUTPATIENT
Start: 2023-12-05 | End: 2024-01-04

## 2023-12-05 ASSESSMENT — PATIENT HEALTH QUESTIONNAIRE - PHQ9: CLINICAL INTERPRETATION OF PHQ2 SCORE: 0

## 2023-12-05 ASSESSMENT — FIBROSIS 4 INDEX: FIB4 SCORE: 1.12

## 2023-12-05 NOTE — PROGRESS NOTES
"Subjective:   CC: Annual physical, bilateral ear fullness, anxiety with flying    HPI:     Kenneth Samuels is a 63 y.o. male, established patient of the clinic.     Patient is overall healthy.   He has mild to moderate is controlled with diet.  He also has mild BPH that does not require treatment.  He also had congenital scoliosis of the thoracolumbar spine.  He had several surgeries in the past.  He continues to suffer chronic pain in this area.  However, he does not require pain medication regularly.  He is active and able to maintain full-time employment.  He complains of anxiety with flying and would like to try medication for this.  He noticed recent development of bilateral ear fullness.  Denies fever, chills, auditory impairment, vertigo, rhinorrhea, cough, sore throat, recent sick exposure.    Current medicines (including changes today)  Current Outpatient Medications   Medication Sig Dispense Refill    ALPRAZolam (XANAX) 0.25 MG Tab Take 1 Tablet by mouth at bedtime as needed for Anxiety (fly anxiety) for up to 30 days. 30 Tablet 0    methylPREDNISolone (MEDROL DOSEPAK) 4 MG Tablet Therapy Pack 6 tabs day 1, 5 tabs day 2, 4 tabs day 3, 3 tabs day 4, 2 tabs day 5, 1 tab day 6. Take with food. 21 Tablet 0    Ascorbic Acid (VITAMIN C PO) Take  by mouth.       No current facility-administered medications for this visit.     He  has no past medical history of ASTHMA, COPD, or Diabetes.    I reviewed patient's problem list, allergies, medications, family hx, social hx with patient and update EPIC.        Objective:     /80 (BP Location: Right arm, Patient Position: Sitting, BP Cuff Size: Adult)   Pulse 68   Temp 37.1 °C (98.8 °F) (Temporal)   Ht 1.651 m (5' 5\")   Wt 63.5 kg (139 lb 15.9 oz)   SpO2 97%  Body mass index is 23.3 kg/m².    Physical Exam:  Constitutional: awake, alert, in no distress.  Skin: Warm, dry, good turgor, no rashes, bruises, ulcers in visible areas.  Eye: conjunctiva clear, lids neg " for edema or lesions.  ENMT: Moderate bilateral ear effusion without erythema/bulging TMs bilaterally. Oral and nasal mucosa wnl. Lips without lesions, good dentition, oropharynx clear.  Neck: Trachea midline, no masses, no thyromegaly. No cervical or supraclavicular lymphadenopathy  Respiratory: Unlabored respiratory effort, lungs clear to auscultation, no wheezes, no rales.  Cardiovascular: Normal S1, S2, no murmur, no pedal edema.  Psych: Oriented x3, affect and mood wnl, intact judgement and insight.       Assessment and Plan:   The following treatment plan was discussed    1. Pure hypercholesterolemia  Chronic, mild, working on dietary and lifestyle modification to improve this condition.  Recent labs showed improvement of hyperlipidemia.  Will continue to monitor.    2. Leg length discrepancy  3. Radiculopathy of thoracolumbar region  4. Congenital scoliosis_throcolumbar_s/p 2 surgeries  Chronic, stable, chronic pain is controlled with behavioral modification.  Patient also takes NSAIDs as needed.  Conservative management recommended and discussed.    5. Benign prostatic hyperplasia with urinary frequency  Chronic, very mild, does not require treatment, will continue to monitor.    6. Encounter for screening for HIV  - HIV AG/AB COMBO ASSAY SCREENING; Future    7. Need for vaccination  - INFLUENZA VACCINE QUAD INJ (PF)    8. PE (physical exam), annual  Labs per orders  Immunization reviewed and discussed  Patient was counseled about  diet, supplements, exercises.   Preventive cares reviewed, discussed, and updated as appropriate.     - CBC WITH DIFFERENTIAL; Future  - Comp Metabolic Panel; Future  - HEMOGLOBIN A1C; Future  - Lipid Profile; Future    9. Anxiety with flying  History and exam are concerning for anxiety with flying.  - ALPRAZolam (XANAX) 0.25 MG Tab; Take 1 Tablet by mouth at bedtime as needed for Anxiety (fly anxiety) for up to 30 days.  Dispense: 30 Tablet; Refill: 0  - Risks, benefits, side  effects, as well as potential health complications associated with Xanax were previously discussed with patient. Appropriate counseling provided.      10. Middle ear effusion, bilateral  History and exam are concerning for bilateral middle ear effusion.  - methylPREDNISolone (MEDROL DOSEPAK) 4 MG Tablet Therapy Pack; 6 tabs day 1, 5 tabs day 2, 4 tabs day 3, 3 tabs day 4, 2 tabs day 5, 1 tab day 6. Take with food.  Dispense: 21 Tablet; Refill: 0     Ly ANTONIO Nunez M.D.      Followup: Return in about 1 year (around 12/5/2024) for annual PE.    Please note that this dictation was created using voice recognition software. I have made every reasonable attempt to correct obvious errors, but I expect that there are errors of grammar and possibly content that I did not discover before finalizing the note.

## 2024-01-01 ENCOUNTER — OFFICE VISIT (OUTPATIENT)
Dept: URGENT CARE | Facility: CLINIC | Age: 64
End: 2024-01-01
Payer: COMMERCIAL

## 2024-01-01 VITALS
DIASTOLIC BLOOD PRESSURE: 82 MMHG | HEIGHT: 67 IN | OXYGEN SATURATION: 98 % | RESPIRATION RATE: 16 BRPM | SYSTOLIC BLOOD PRESSURE: 124 MMHG | TEMPERATURE: 98 F | BODY MASS INDEX: 21.97 KG/M2 | HEART RATE: 85 BPM | WEIGHT: 140 LBS

## 2024-01-01 DIAGNOSIS — B34.9 NONSPECIFIC SYNDROME SUGGESTIVE OF VIRAL ILLNESS: ICD-10-CM

## 2024-01-01 DIAGNOSIS — R05.1 ACUTE COUGH: ICD-10-CM

## 2024-01-01 DIAGNOSIS — J34.89 RHINORRHEA: ICD-10-CM

## 2024-01-01 DIAGNOSIS — J02.9 SORE THROAT: ICD-10-CM

## 2024-01-01 LAB
FLUAV RNA SPEC QL NAA+PROBE: NEGATIVE
FLUBV RNA SPEC QL NAA+PROBE: NEGATIVE
RSV RNA SPEC QL NAA+PROBE: NEGATIVE
S PYO DNA SPEC NAA+PROBE: NOT DETECTED
SARS-COV-2 RNA RESP QL NAA+PROBE: NEGATIVE

## 2024-01-01 PROCEDURE — 3074F SYST BP LT 130 MM HG: CPT | Performed by: NURSE PRACTITIONER

## 2024-01-01 PROCEDURE — 0241U POCT CEPHEID COV-2, FLU A/B, RSV - PCR: CPT | Performed by: NURSE PRACTITIONER

## 2024-01-01 PROCEDURE — 3079F DIAST BP 80-89 MM HG: CPT | Performed by: NURSE PRACTITIONER

## 2024-01-01 PROCEDURE — 87651 STREP A DNA AMP PROBE: CPT | Performed by: NURSE PRACTITIONER

## 2024-01-01 PROCEDURE — 99214 OFFICE O/P EST MOD 30 MIN: CPT | Performed by: NURSE PRACTITIONER

## 2024-01-01 RX ORDER — BENZONATATE 100 MG/1
100 CAPSULE ORAL 3 TIMES DAILY PRN
Qty: 30 CAPSULE | Refills: 0 | Status: SHIPPED | OUTPATIENT
Start: 2024-01-01 | End: 2024-01-11

## 2024-01-01 ASSESSMENT — FIBROSIS 4 INDEX: FIB4 SCORE: 1.12

## 2024-01-01 NOTE — PROGRESS NOTES
"Subjective:   Kenneth Samuels is a 63 y.o. male who presents for Cough (Started 3 days ago )       HPI  Patient is a pleasant 63 y.o. who presents for evaluation of 3-day history of cough, runny nose, sore throat, difficulty sleeping secondary to cough.  Patient states several people within his place of employment have been ill with similar symptoms.  Patient has taken TheraFlu with very mild relief of his symptoms.    ROS  All other systems are negative except as documented above within HPI.    MEDS:   Current Outpatient Medications:     benzonatate (TESSALON) 100 MG Cap, Take 1 Capsule by mouth 3 times a day as needed for Cough., Disp: 30 Capsule, Rfl: 0    ALPRAZolam (XANAX) 0.25 MG Tab, Take 1 Tablet by mouth at bedtime as needed for Anxiety (fly anxiety) for up to 30 days. (Patient not taking: Reported on 1/1/2024), Disp: 30 Tablet, Rfl: 0    methylPREDNISolone (MEDROL DOSEPAK) 4 MG Tablet Therapy Pack, 6 tabs day 1, 5 tabs day 2, 4 tabs day 3, 3 tabs day 4, 2 tabs day 5, 1 tab day 6. Take with food. (Patient not taking: Reported on 1/1/2024), Disp: 21 Tablet, Rfl: 0    Ascorbic Acid (VITAMIN C PO), Take  by mouth. (Patient not taking: Reported on 1/1/2024), Disp: , Rfl:   ALLERGIES: No Known Allergies    Patient's PMH, SocHx, SurgHx, FamHx, Drug allergies and medications were reviewed.     Objective:   /82 (BP Location: Left arm, Patient Position: Sitting, BP Cuff Size: Adult)   Pulse 85   Temp 36.7 °C (98 °F) (Temporal)   Resp 16   Ht 1.702 m (5' 7\")   Wt 63.5 kg (140 lb)   SpO2 98%   BMI 21.93 kg/m²     Physical Exam  Vitals and nursing note reviewed.   Constitutional:       General: He is awake.      Appearance: Normal appearance. He is well-developed.   HENT:      Head: Normocephalic and atraumatic.      Right Ear: Tympanic membrane, ear canal and external ear normal.      Left Ear: Tympanic membrane, ear canal and external ear normal.      Nose: Congestion and rhinorrhea present.      " Mouth/Throat:      Mouth: Mucous membranes are moist.      Pharynx: Oropharynx is clear. Posterior oropharyngeal erythema present. No oropharyngeal exudate.   Eyes:      Extraocular Movements: Extraocular movements intact.      Conjunctiva/sclera: Conjunctivae normal.   Neck:      Trachea: Trachea and phonation normal.   Cardiovascular:      Rate and Rhythm: Normal rate and regular rhythm.      Pulses: Normal pulses.      Heart sounds: Normal heart sounds.   Pulmonary:      Effort: Pulmonary effort is normal.      Breath sounds: Normal breath sounds and air entry. No stridor. No wheezing, rhonchi or rales.   Musculoskeletal:         General: Normal range of motion.      Cervical back: Normal range of motion and neck supple.   Lymphadenopathy:      Head:      Right side of head: Tonsillar adenopathy present.      Left side of head: Tonsillar adenopathy present.   Skin:     General: Skin is warm and dry.      Capillary Refill: Capillary refill takes less than 2 seconds.   Neurological:      Mental Status: He is alert and oriented to person, place, and time.   Psychiatric:         Mood and Affect: Mood normal.         Behavior: Behavior is cooperative.         Thought Content: Thought content normal.         Assessment/Plan:   Assessment    1. Nonspecific syndrome suggestive of viral illness    2. Acute cough  - benzonatate (TESSALON) 100 MG Cap; Take 1 Capsule by mouth 3 times a day as needed for Cough.  Dispense: 30 Capsule; Refill: 0  - POCT CEPHEID COV-2, FLU A/B, RSV - PCR  - POCT CEPHEID GROUP A STREP - PCR    3. Rhinorrhea  - POCT CEPHEID COV-2, FLU A/B, RSV - PCR  - POCT CEPHEID GROUP A STREP - PCR    4. Sore throat  - POCT CEPHEID COV-2, FLU A/B, RSV - PCR  - POCT CEPHEID GROUP A STREP - PCR      Vital signs stable at today's acute urgent care visit.  POCT testing negative for COVID, flu, RSV, group A strep.  Begin medications as listed. Recommend continued over-the-counter cough medication as needed for  symptomatic relief.    Advised the patient to follow-up with the primary care provider if symptoms persist.  Red flags discussed and indications to immediately call 911 or present to the ED. All questions were encouraged and answered to the patient's satisfaction and understanding, and they agree to the plan of care.     This is an acute problem with uncertain prognosis, medication management and instructions as well as management options were provided.  I personally reviewed prior external notes and test results pertinent to today and independently reviewed and interpreted all diagnostics, to include POCT testing. Time spent evaluating this patient includes preparing for visit, counseling/education, exam, evaluation, obtaining history, and ordering lab/test/procedures.      Please note that this dictation was created using voice recognition software. I have made a reasonable attempt to correct obvious errors, but I expect that there are errors of grammar and possibly content that I did not discover before finalizing the note.

## 2024-01-01 NOTE — LETTER
January 1, 2024    To Whom It May Concern:         This is confirmation that Kenneth Samuels attended his scheduled appointment with SHONA Olvera on 1/01/24. Please excuse him from work on the date of 1/2 due to an acute medical illness.         If you have any questions please do not hesitate to call me at the phone number listed below.    Sincerely,          NEETU OlveraRTawandaN.  [phone]

## 2024-01-03 ENCOUNTER — TELEPHONE (OUTPATIENT)
Dept: URGENT CARE | Facility: CLINIC | Age: 64
End: 2024-01-03

## 2024-01-04 ENCOUNTER — OFFICE VISIT (OUTPATIENT)
Dept: MEDICAL GROUP | Age: 64
End: 2024-01-04
Payer: COMMERCIAL

## 2024-01-04 ENCOUNTER — TELEPHONE (OUTPATIENT)
Dept: MEDICAL GROUP | Age: 64
End: 2024-01-04

## 2024-01-04 VITALS
HEIGHT: 65 IN | TEMPERATURE: 99.1 F | WEIGHT: 138 LBS | DIASTOLIC BLOOD PRESSURE: 72 MMHG | SYSTOLIC BLOOD PRESSURE: 120 MMHG | HEART RATE: 89 BPM | BODY MASS INDEX: 22.99 KG/M2 | OXYGEN SATURATION: 98 %

## 2024-01-04 DIAGNOSIS — J20.0 ACUTE BRONCHITIS DUE TO MYCOPLASMA PNEUMONIAE: ICD-10-CM

## 2024-01-04 PROCEDURE — 3078F DIAST BP <80 MM HG: CPT | Performed by: INTERNAL MEDICINE

## 2024-01-04 PROCEDURE — 3074F SYST BP LT 130 MM HG: CPT | Performed by: INTERNAL MEDICINE

## 2024-01-04 PROCEDURE — 99204 OFFICE O/P NEW MOD 45 MIN: CPT | Performed by: INTERNAL MEDICINE

## 2024-01-04 RX ORDER — AZITHROMYCIN 250 MG/1
TABLET, FILM COATED ORAL
Qty: 6 TABLET | Refills: 1 | Status: SHIPPED | OUTPATIENT
Start: 2024-01-04 | End: 2024-01-11

## 2024-01-04 ASSESSMENT — ENCOUNTER SYMPTOMS
MUSCULOSKELETAL NEGATIVE: 1
HEMOPTYSIS: 0
SPUTUM PRODUCTION: 0
CONSTITUTIONAL NEGATIVE: 1
SORE THROAT: 1
SHORTNESS OF BREATH: 0
WHEEZING: 0
EYES NEGATIVE: 1
PSYCHIATRIC NEGATIVE: 1
CARDIOVASCULAR NEGATIVE: 1
NEUROLOGICAL NEGATIVE: 1
GASTROINTESTINAL NEGATIVE: 1
COUGH: 1
SINUS PAIN: 1

## 2024-01-04 ASSESSMENT — PATIENT HEALTH QUESTIONNAIRE - PHQ9: CLINICAL INTERPRETATION OF PHQ2 SCORE: 0

## 2024-01-04 ASSESSMENT — FIBROSIS 4 INDEX: FIB4 SCORE: 1.12

## 2024-01-04 NOTE — TELEPHONE ENCOUNTER
Patient called in stating that you were suppose to send a cough medication over for the patient please advise

## 2024-01-04 NOTE — LETTER
January 4, 2024    To Whom It May Concern:         This is confirmation that Kenneth Samuels is currently under my care please excuse him for 1 week due to illness          If you have any questions please do not hesitate to call me at the phone number listed below.    Sincerely,          Levon Morales M.D   [phone]

## 2024-01-05 NOTE — PROGRESS NOTES
"Fortino Samuels is a 63 y.o. male who is new patient to me unable see his PCP today.  He presents with Follow-Up (Was seen at urgent care 2 days ago.,  And yesterday he could not go to work because he is still coughing , states he coughs a lot at night time so he cannot sleep also needs a Dr notes )  Patient tested negative for COVID, RSV, influenza, beta strep in the urgent care a few days ago.  Continues to have cough and wants cough syrup.  No fever or chills.  No sputum production.  No chest pain or dyspnea and no wheezing.  He does have marked nasal congestion and right-sided earache.          HPI    Review of Systems   Constitutional: Negative.    HENT:  Positive for congestion, ear pain, sinus pain and sore throat.    Eyes: Negative.    Respiratory:  Positive for cough. Negative for hemoptysis, sputum production, shortness of breath and wheezing.    Cardiovascular: Negative.    Gastrointestinal: Negative.    Genitourinary: Negative.    Musculoskeletal: Negative.    Skin: Negative.    Neurological: Negative.    Endo/Heme/Allergies: Negative.    Psychiatric/Behavioral: Negative.     All other systems reviewed and are negative.             Objective     /72 (BP Location: Right arm, Patient Position: Sitting, BP Cuff Size: Adult)   Pulse 89   Temp 37.3 °C (99.1 °F) (Temporal)   Ht 1.651 m (5' 5\")   Wt 62.6 kg (138 lb)   SpO2 98%   BMI 22.96 kg/m²      Physical Exam  Vitals and nursing note reviewed.   Constitutional:       General: He is not in acute distress.     Appearance: He is well-developed. He is not diaphoretic.   HENT:      Head: Normocephalic and atraumatic.      Comments: Both ear canals and TMs are intact without evidence of any otitis externa or otitis media.  Pharynx is clear except for mild erythema.  The nasal passages reveal boggy nasal mucosa and clear nasal discharge and no sinus tenderness     Right Ear: External ear normal.      Left Ear: External ear normal.      Nose: " Congestion and rhinorrhea present.      Mouth/Throat:      Pharynx: No oropharyngeal exudate.   Eyes:      General: No scleral icterus.        Right eye: No discharge.         Left eye: No discharge.      Conjunctiva/sclera: Conjunctivae normal.      Pupils: Pupils are equal, round, and reactive to light.   Neck:      Thyroid: No thyromegaly.      Vascular: No JVD.      Trachea: No tracheal deviation.   Cardiovascular:      Rate and Rhythm: Normal rate and regular rhythm.      Heart sounds: Normal heart sounds. No murmur heard.     No friction rub. No gallop.   Pulmonary:      Effort: Pulmonary effort is normal. No respiratory distress.      Breath sounds: Normal breath sounds. No stridor. No wheezing or rales.      Comments: No wheezes or rales.  Chest:      Chest wall: No tenderness.   Abdominal:      General: Bowel sounds are normal. There is no distension.      Palpations: Abdomen is soft. There is no mass.      Tenderness: There is no abdominal tenderness. There is no guarding or rebound.      Hernia: No hernia is present.   Genitourinary:     Penis: No tenderness.       Rectum: Guaiac result negative.   Musculoskeletal:         General: No tenderness. Normal range of motion.      Cervical back: Normal range of motion and neck supple.   Lymphadenopathy:      Cervical: No cervical adenopathy.   Skin:     General: Skin is warm and dry.      Coloration: Skin is not pale.      Findings: No erythema or rash.   Neurological:      Mental Status: He is alert and oriented to person, place, and time.      Cranial Nerves: No cranial nerve deficit.      Motor: No abnormal muscle tone.      Coordination: Coordination normal.      Deep Tendon Reflexes: Reflexes are normal and symmetric. Reflexes normal.   Psychiatric:         Behavior: Behavior normal.         Thought Content: Thought content normal.         Judgment: Judgment normal.                              Assessment & Plan        1. Acute bronchitis due to Mycoplasma  pneumoniae  Probably viral URI but cannot rule out mycoplasma.  Since he is not getting better we will give him a trial of macrolide with Z-Sean.  Also cough syrup with codeine and a work note for 1 week.  - CODEINE-GUAIFENESIN 200-10 MG/5ML Liquid; Take 10 mL by mouth 4 times a day as needed (Cough) for up to 10 days.  Dispense: 400 mL; Refill: 0  - azithromycin (ZITHROMAX) 250 MG Tab; Take 2 tabs today then 1 per day for 4 days  Dispense: 6 Tablet; Refill: 1

## 2024-01-11 ENCOUNTER — TELEMEDICINE (OUTPATIENT)
Dept: MEDICAL GROUP | Facility: MEDICAL CENTER | Age: 64
End: 2024-01-11
Payer: COMMERCIAL

## 2024-01-11 VITALS — BODY MASS INDEX: 23.32 KG/M2 | HEIGHT: 65 IN | WEIGHT: 140 LBS | RESPIRATION RATE: 18 BRPM

## 2024-01-11 DIAGNOSIS — J20.9 ACUTE BRONCHITIS, UNSPECIFIED ORGANISM: ICD-10-CM

## 2024-01-11 DIAGNOSIS — E78.00 PURE HYPERCHOLESTEROLEMIA: ICD-10-CM

## 2024-01-11 DIAGNOSIS — Z02.9 ADMINISTRATIVE ENCOUNTER: ICD-10-CM

## 2024-01-11 PROCEDURE — 99214 OFFICE O/P EST MOD 30 MIN: CPT | Mod: 95 | Performed by: FAMILY MEDICINE

## 2024-01-11 ASSESSMENT — FIBROSIS 4 INDEX: FIB4 SCORE: 1.12

## 2024-01-11 NOTE — LETTER
January 11, 2024        Re: Kenneth Samuels    To whom it may concern,    My name is Mily Nunez MD. I am taking care of Kenneth Samuels, who is suffering acute illness that requires rest and treatment. Patient was seen by myself today 1/11/2024. He continues to experience persistent fatigue, cough, and weakness. Therefore, I extend his medical leave to 01/17/2024. He can return to work on 01/18/2024.     If you have any questions, please do not hesitate to call my office.     Best regards,           Mily Nunez M.D.

## 2024-01-11 NOTE — PROGRESS NOTES
"Virtual Visit: Established Patient   This visit was conducted via  [email or web address]  using secure and encrypted videoconferencing technology.   The patient was in their home in the [facility].    The patient's identity was confirmed and verbal consent was obtained for this virtual visit.    Subjective:   CC: acute bronchitis, weakness    Kenneth Samuels is a 63 y.o. male who was diagnosed with acute bronchitis 10 days ago. He was seen by urgent care on 1/1/2024 and by Dr. Morales on 1/4/2024. He was tested negative for Covid, flu, RSV, and strep. However, he had significant weakness and cough. He was treated with Zpak and Guaifenesin-Codeine cough syrup. His cough is improving. However, he continues to have significant fatigue and weakness.  It is difficult for him to perform daily activity.  Patient is not able to return to work because of symptoms.    Patient had chronic hyperlipidemia.  He has been working on dietary and lifestyle modification to improve this condition.  Negative history of illicit drugs, alcohol, tobacco abuse. Body mass index is 23.3 kg/m².       Current medicines (including changes today)  No current outpatient medications on file.     No current facility-administered medications for this visit.       Patient Active Problem List    Diagnosis Date Noted    Anxiety with flying 12/05/2023    Pure hypercholesterolemia 06/03/2021    Congenital scoliosis_throcolumbar_s/p 2 surgeries 05/27/2021    Radiculopathy of thoracolumbar region 05/27/2021    Leg length discrepancy 05/27/2021    Benign prostatic hyperplasia with urinary frequency 05/27/2021        Objective:   Resp 18   Ht 1.651 m (5' 5\")   Wt 63.5 kg (140 lb)   BMI 23.30 kg/m²     Physical Exam:  Constitutional: Alert, no distress, well-groomed.  Skin: No rashes in visible areas.  Eye: Round. Conjunctiva clear, lids normal. No icterus.   ENMT: Lips pink without lesions, good dentition, moist mucous membranes. Phonation normal.  Neck: No masses, no " thyromegaly. Moves freely without pain.  Respiratory: Unlabored respiratory effort, no cough or audible wheeze  Psych: Alert and oriented x3, normal affect and mood.     Assessment and Plan:   The following treatment plan was discussed:     1. Pure hypercholesterolemia  Chronic, mild, controlled with diet and lifestyle modification.  Body mass index is 23.3 kg/m².   Will continue to monitor.    2. Acute bronchitis, unspecified organism  3. Administrative encounter  Patient was recently diagnosed with acute bronchitis.  His symptoms are improving with treatment provided by Dr. Morales on 1/4/2024.  His cough is improving.  However, he complains of persistent weakness and fatigue leading to daytime hypersomnolence and difficulty with daytime activity.  Patient is unable to return to work because of symptoms.  I will extend medical leave until 1/17/2024.  Patient will return to work on 1/18/2024.   -Work letter provided  -Continue rest, activity modification  -Take cough medication as needed       Follow-up: Return for As needed.

## 2024-05-11 ENCOUNTER — HOSPITAL ENCOUNTER (OUTPATIENT)
Dept: LAB | Facility: MEDICAL CENTER | Age: 64
End: 2024-05-11
Attending: FAMILY MEDICINE
Payer: COMMERCIAL

## 2024-05-11 DIAGNOSIS — Z11.4 ENCOUNTER FOR SCREENING FOR HIV: ICD-10-CM

## 2024-05-11 DIAGNOSIS — Z00.00 PE (PHYSICAL EXAM), ANNUAL: ICD-10-CM

## 2024-05-11 LAB
ALBUMIN SERPL BCP-MCNC: 4.6 G/DL (ref 3.2–4.9)
ALBUMIN/GLOB SERPL: 1.8 G/DL
ALP SERPL-CCNC: 58 U/L (ref 30–99)
ALT SERPL-CCNC: 9 U/L (ref 2–50)
ANION GAP SERPL CALC-SCNC: 11 MMOL/L (ref 7–16)
AST SERPL-CCNC: 15 U/L (ref 12–45)
BASOPHILS # BLD AUTO: 1.1 % (ref 0–1.8)
BASOPHILS # BLD: 0.05 K/UL (ref 0–0.12)
BILIRUB SERPL-MCNC: 1.3 MG/DL (ref 0.1–1.5)
BUN SERPL-MCNC: 13 MG/DL (ref 8–22)
CALCIUM ALBUM COR SERPL-MCNC: 9.1 MG/DL (ref 8.5–10.5)
CALCIUM SERPL-MCNC: 9.6 MG/DL (ref 8.5–10.5)
CHLORIDE SERPL-SCNC: 102 MMOL/L (ref 96–112)
CHOLEST SERPL-MCNC: 212 MG/DL (ref 100–199)
CO2 SERPL-SCNC: 27 MMOL/L (ref 20–33)
CREAT SERPL-MCNC: 0.76 MG/DL (ref 0.5–1.4)
EOSINOPHIL # BLD AUTO: 0.24 K/UL (ref 0–0.51)
EOSINOPHIL NFR BLD: 5.4 % (ref 0–6.9)
ERYTHROCYTE [DISTWIDTH] IN BLOOD BY AUTOMATED COUNT: 42 FL (ref 35.9–50)
EST. AVERAGE GLUCOSE BLD GHB EST-MCNC: 117 MG/DL
GFR SERPLBLD CREATININE-BSD FMLA CKD-EPI: 100 ML/MIN/1.73 M 2
GLOBULIN SER CALC-MCNC: 2.6 G/DL (ref 1.9–3.5)
GLUCOSE SERPL-MCNC: 113 MG/DL (ref 65–99)
HBA1C MFR BLD: 5.7 % (ref 4–5.6)
HCT VFR BLD AUTO: 49.9 % (ref 42–52)
HDLC SERPL-MCNC: 57 MG/DL
HGB BLD-MCNC: 16.9 G/DL (ref 14–18)
HIV 1+2 AB+HIV1 P24 AG SERPL QL IA: NORMAL
IMM GRANULOCYTES # BLD AUTO: 0.01 K/UL (ref 0–0.11)
IMM GRANULOCYTES NFR BLD AUTO: 0.2 % (ref 0–0.9)
LDLC SERPL CALC-MCNC: 140 MG/DL
LYMPHOCYTES # BLD AUTO: 1.67 K/UL (ref 1–4.8)
LYMPHOCYTES NFR BLD: 37.9 % (ref 22–41)
MCH RBC QN AUTO: 31.5 PG (ref 27–33)
MCHC RBC AUTO-ENTMCNC: 33.9 G/DL (ref 32.3–36.5)
MCV RBC AUTO: 92.9 FL (ref 81.4–97.8)
MONOCYTES # BLD AUTO: 0.3 K/UL (ref 0–0.85)
MONOCYTES NFR BLD AUTO: 6.8 % (ref 0–13.4)
NEUTROPHILS # BLD AUTO: 2.14 K/UL (ref 1.82–7.42)
NEUTROPHILS NFR BLD: 48.6 % (ref 44–72)
NRBC # BLD AUTO: 0 K/UL
NRBC BLD-RTO: 0 /100 WBC (ref 0–0.2)
PLATELET # BLD AUTO: 247 K/UL (ref 164–446)
PMV BLD AUTO: 10.1 FL (ref 9–12.9)
POTASSIUM SERPL-SCNC: 4.9 MMOL/L (ref 3.6–5.5)
PROT SERPL-MCNC: 7.2 G/DL (ref 6–8.2)
RBC # BLD AUTO: 5.37 M/UL (ref 4.7–6.1)
SODIUM SERPL-SCNC: 140 MMOL/L (ref 135–145)
TRIGL SERPL-MCNC: 76 MG/DL (ref 0–149)
WBC # BLD AUTO: 4.4 K/UL (ref 4.8–10.8)

## 2024-06-06 ENCOUNTER — OFFICE VISIT (OUTPATIENT)
Dept: MEDICAL GROUP | Facility: MEDICAL CENTER | Age: 64
End: 2024-06-06
Payer: COMMERCIAL

## 2024-06-06 VITALS
SYSTOLIC BLOOD PRESSURE: 118 MMHG | RESPIRATION RATE: 20 BRPM | TEMPERATURE: 98.2 F | DIASTOLIC BLOOD PRESSURE: 82 MMHG | WEIGHT: 139.66 LBS | HEIGHT: 65 IN | HEART RATE: 75 BPM | BODY MASS INDEX: 23.27 KG/M2

## 2024-06-06 DIAGNOSIS — R35.0 BENIGN PROSTATIC HYPERPLASIA WITH URINARY FREQUENCY: ICD-10-CM

## 2024-06-06 DIAGNOSIS — E78.00 PURE HYPERCHOLESTEROLEMIA: ICD-10-CM

## 2024-06-06 DIAGNOSIS — Q67.5 CONGENITAL SCOLIOSIS: ICD-10-CM

## 2024-06-06 DIAGNOSIS — Z12.11 COLON CANCER SCREENING: ICD-10-CM

## 2024-06-06 DIAGNOSIS — M54.15 RADICULOPATHY OF THORACOLUMBAR REGION: ICD-10-CM

## 2024-06-06 DIAGNOSIS — R73.03 PREDIABETES: ICD-10-CM

## 2024-06-06 DIAGNOSIS — Z00.00 PE (PHYSICAL EXAM), ANNUAL: ICD-10-CM

## 2024-06-06 DIAGNOSIS — N40.1 BENIGN PROSTATIC HYPERPLASIA WITH URINARY FREQUENCY: ICD-10-CM

## 2024-06-06 DIAGNOSIS — M21.70 LEG LENGTH DISCREPANCY: ICD-10-CM

## 2024-06-06 PROCEDURE — 3074F SYST BP LT 130 MM HG: CPT | Performed by: FAMILY MEDICINE

## 2024-06-06 PROCEDURE — 99214 OFFICE O/P EST MOD 30 MIN: CPT | Mod: 25 | Performed by: FAMILY MEDICINE

## 2024-06-06 PROCEDURE — 99396 PREV VISIT EST AGE 40-64: CPT | Performed by: FAMILY MEDICINE

## 2024-06-06 PROCEDURE — 3079F DIAST BP 80-89 MM HG: CPT | Performed by: FAMILY MEDICINE

## 2024-06-06 ASSESSMENT — FIBROSIS 4 INDEX: FIB4 SCORE: 1.3

## 2024-06-09 NOTE — PROGRESS NOTES
Verbal consent was acquired by the patient to use [employer] ambient listening note generation during this visit: YES    CC: annual PE, abnormal lab review     Assessment & Plan:     1. Colon cancer screening  - COLOGUARD (FIT DNA)    2. Radiculopathy of thoracolumbar region  3. Leg length discrepancy  4. Congenital scoliosis_throcolumbar_s/p 2 surgeries  Chronic, stable, controlled with conservative treatment. Will continue to monitor.     5. Benign prostatic hyperplasia with urinary frequency  Mild, asymptomatic, will continue to monitor.     6. PE (physical exam), annual  Labs per orders  Immunization reviewed and discussed.  Patient was counseled about  diet, supplements, exercises.   Preventive cares reviewed, discussed, and updated as appropriate.       7. Pure hypercholesterolemia  Chronic worsening hyperlipidemia per recent labs.   His weight is stable.   No recent changes in diet or physical activities.   - dietary modification, exercise, and weight loss.   - avoid alcohol, drugs, tobacco products   - follow up in 6 months for reassessment   - Lipid Profile; Future    8. Prediabetes  New onset, recent A1C was 5.7  Negative family history of type 2 diabetes.   No changes in diet, physical activities.   - Dietary/lifestyle modification and weight loss    - follow up in 6 months for reassessment.   - HEMOGLOBIN A1C; Future  - Comp Metabolic Panel; Future     Subjective:       HPI:   History of Present Illness    Patient is doing well. He does not take any medications.   He maintains healthy diet, but does not exercises   History work is physical.   Recent labs showed elevated A1C and worsening hyperlipidemia.   His weight is stable.     His musculoskeletal symptoms are stable.       No current outpatient medications on file.     Objective:     Exam:  /82 (BP Location: Left arm, Patient Position: Sitting, BP Cuff Size: Adult)   Pulse 75   Temp 36.8 °C (98.2 °F) (Temporal)   Resp 20   Ht 1.651 m (5'  "5\")   Wt 63.3 kg (139 lb 10.6 oz)   BMI 23.24 kg/m²  Body mass index is 23.24 kg/m².    Constitutional: awake, alert, in no distress.  Skin: Warm, dry, good turgor, no rashes, bruises, ulcers in visible areas.  Eye: conjunctiva clear, lids neg for edema or lesions.  ENMT: TM and auditory canals wnl. Oral and nasal mucosa wnl. Lips without lesions, good dentition, oropharynx clear.  Neck: Trachea midline, no masses, no thyromegaly. No cervical or supraclavicular lymphadenopathy  Respiratory: Unlabored respiratory effort, lungs clear to auscultation, no wheezes, no rales.  Cardiovascular: Normal S1, S2, no murmur, no pedal edema.  Psych: Oriented x3, affect and mood wnl, intact judgement and insight.         Return in about 6 months (around 12/6/2024) for Multiple issues.          Please note that this dictation was created using voice recognition software. I have made every reasonable attempt to correct obvious errors, but I expect that there are errors of grammar and possibly content that I did not discover before finalizing the note.        "

## 2024-11-01 ENCOUNTER — PATIENT MESSAGE (OUTPATIENT)
Dept: HEALTH INFORMATION MANAGEMENT | Facility: OTHER | Age: 64
End: 2024-11-01

## 2024-11-16 ENCOUNTER — HOSPITAL ENCOUNTER (OUTPATIENT)
Dept: LAB | Facility: MEDICAL CENTER | Age: 64
End: 2024-11-16
Attending: FAMILY MEDICINE
Payer: COMMERCIAL

## 2024-11-16 DIAGNOSIS — E78.00 PURE HYPERCHOLESTEROLEMIA: ICD-10-CM

## 2024-11-16 DIAGNOSIS — R73.03 PREDIABETES: ICD-10-CM

## 2024-11-16 LAB
ALBUMIN SERPL BCP-MCNC: 4.2 G/DL (ref 3.2–4.9)
ALBUMIN/GLOB SERPL: 1.6 G/DL
ALP SERPL-CCNC: 50 U/L (ref 30–99)
ALT SERPL-CCNC: 9 U/L (ref 2–50)
ANION GAP SERPL CALC-SCNC: 9 MMOL/L (ref 7–16)
AST SERPL-CCNC: 15 U/L (ref 12–45)
BILIRUB SERPL-MCNC: 1.3 MG/DL (ref 0.1–1.5)
BUN SERPL-MCNC: 9 MG/DL (ref 8–22)
CALCIUM ALBUM COR SERPL-MCNC: 8.6 MG/DL (ref 8.5–10.5)
CALCIUM SERPL-MCNC: 8.8 MG/DL (ref 8.5–10.5)
CHLORIDE SERPL-SCNC: 102 MMOL/L (ref 96–112)
CHOLEST SERPL-MCNC: 182 MG/DL (ref 100–199)
CO2 SERPL-SCNC: 26 MMOL/L (ref 20–33)
CREAT SERPL-MCNC: 0.76 MG/DL (ref 0.5–1.4)
EST. AVERAGE GLUCOSE BLD GHB EST-MCNC: 111 MG/DL
GFR SERPLBLD CREATININE-BSD FMLA CKD-EPI: 100 ML/MIN/1.73 M 2
GLOBULIN SER CALC-MCNC: 2.6 G/DL (ref 1.9–3.5)
GLUCOSE SERPL-MCNC: 96 MG/DL (ref 65–99)
HBA1C MFR BLD: 5.5 % (ref 4–5.6)
HDLC SERPL-MCNC: 49 MG/DL
LDLC SERPL CALC-MCNC: 114 MG/DL
POTASSIUM SERPL-SCNC: 4.3 MMOL/L (ref 3.6–5.5)
PROT SERPL-MCNC: 6.8 G/DL (ref 6–8.2)
SODIUM SERPL-SCNC: 137 MMOL/L (ref 135–145)
TRIGL SERPL-MCNC: 95 MG/DL (ref 0–149)

## 2024-11-16 PROCEDURE — 80053 COMPREHEN METABOLIC PANEL: CPT

## 2024-11-16 PROCEDURE — 80061 LIPID PANEL: CPT

## 2024-11-16 PROCEDURE — 36415 COLL VENOUS BLD VENIPUNCTURE: CPT

## 2024-11-16 PROCEDURE — 83036 HEMOGLOBIN GLYCOSYLATED A1C: CPT

## 2024-12-06 ENCOUNTER — OFFICE VISIT (OUTPATIENT)
Dept: MEDICAL GROUP | Facility: MEDICAL CENTER | Age: 64
End: 2024-12-06
Payer: COMMERCIAL

## 2024-12-06 VITALS
HEIGHT: 65 IN | BODY MASS INDEX: 24.13 KG/M2 | DIASTOLIC BLOOD PRESSURE: 78 MMHG | SYSTOLIC BLOOD PRESSURE: 122 MMHG | HEART RATE: 78 BPM | WEIGHT: 144.84 LBS | OXYGEN SATURATION: 98 % | TEMPERATURE: 97.7 F

## 2024-12-06 DIAGNOSIS — N40.1 BENIGN PROSTATIC HYPERPLASIA WITH URINARY FREQUENCY: ICD-10-CM

## 2024-12-06 DIAGNOSIS — R35.0 BENIGN PROSTATIC HYPERPLASIA WITH URINARY FREQUENCY: ICD-10-CM

## 2024-12-06 DIAGNOSIS — E55.9 VITAMIN D INSUFFICIENCY: ICD-10-CM

## 2024-12-06 DIAGNOSIS — E78.00 PURE HYPERCHOLESTEROLEMIA: ICD-10-CM

## 2024-12-06 DIAGNOSIS — Z00.00 PE (PHYSICAL EXAM), ANNUAL: ICD-10-CM

## 2024-12-06 PROBLEM — M54.15 RADICULOPATHY OF THORACOLUMBAR REGION: Status: RESOLVED | Noted: 2021-05-27 | Resolved: 2024-12-06

## 2024-12-06 PROCEDURE — 3074F SYST BP LT 130 MM HG: CPT | Performed by: FAMILY MEDICINE

## 2024-12-06 PROCEDURE — 3078F DIAST BP <80 MM HG: CPT | Performed by: FAMILY MEDICINE

## 2024-12-06 PROCEDURE — 99214 OFFICE O/P EST MOD 30 MIN: CPT | Performed by: FAMILY MEDICINE

## 2024-12-06 ASSESSMENT — FIBROSIS 4 INDEX: FIB4 SCORE: 1.3

## 2025-01-18 ENCOUNTER — OFFICE VISIT (OUTPATIENT)
Dept: URGENT CARE | Facility: CLINIC | Age: 65
End: 2025-01-18
Payer: COMMERCIAL

## 2025-01-18 VITALS
RESPIRATION RATE: 14 BRPM | OXYGEN SATURATION: 96 % | BODY MASS INDEX: 23.37 KG/M2 | DIASTOLIC BLOOD PRESSURE: 80 MMHG | HEART RATE: 94 BPM | HEIGHT: 66 IN | WEIGHT: 145.4 LBS | SYSTOLIC BLOOD PRESSURE: 134 MMHG | TEMPERATURE: 98.4 F

## 2025-01-18 DIAGNOSIS — R05.1 ACUTE COUGH: ICD-10-CM

## 2025-01-18 PROCEDURE — 3079F DIAST BP 80-89 MM HG: CPT | Performed by: PHYSICIAN ASSISTANT

## 2025-01-18 PROCEDURE — 3075F SYST BP GE 130 - 139MM HG: CPT | Performed by: PHYSICIAN ASSISTANT

## 2025-01-18 PROCEDURE — 99213 OFFICE O/P EST LOW 20 MIN: CPT | Performed by: PHYSICIAN ASSISTANT

## 2025-01-18 RX ORDER — BENZONATATE 100 MG/1
100 CAPSULE ORAL 3 TIMES DAILY PRN
Qty: 60 CAPSULE | Refills: 0 | Status: SHIPPED | OUTPATIENT
Start: 2025-01-18

## 2025-01-18 ASSESSMENT — ENCOUNTER SYMPTOMS
DIZZINESS: 0
CONSTIPATION: 0
DIARRHEA: 0
DIAPHORESIS: 0
WHEEZING: 0
CHILLS: 0
EYE PAIN: 0
EYE REDNESS: 0
VOMITING: 0
SHORTNESS OF BREATH: 0
HEADACHES: 0
SINUS PAIN: 0
SORE THROAT: 0
NAUSEA: 0
FEVER: 0
ABDOMINAL PAIN: 0
EYE DISCHARGE: 0
COUGH: 1

## 2025-01-18 ASSESSMENT — FIBROSIS 4 INDEX: FIB4 SCORE: 1.3

## 2025-01-18 NOTE — LETTER
[facility]  [facility]  [address]  [place] [address]     January 18, 2025    Patient: Kenneth Samuels   YOB: 1960   Date of Visit: 1/18/2025       To Whom It May Concern:    Kenneth Samuels was seen and treated in our department on 1/18/2025.  Please excuse from work from 1/15/2025 - 1/19/2025.  Can return on 1/20    Sincerely,     Bennett Prince P.A.-C.

## 2025-01-18 NOTE — PROGRESS NOTES
"  Subjective:     Kenneth Samuels  is a 64 y.o. male who presents for Cough (X4 days and work sent him, will need note from wed-today.)       He presents today with a cough ongoing over the last 4 days.  He describes the cough is mild.  At this time he denies fever/chills/sweats, chest pain, shortness of breath, nausea/vomiting, abdominal pain, diarrhea.  Was prompted to the urgent care needing a work note per the request of his employer.  Has been using over-the-counter Mucinex.       Review of Systems   Constitutional:  Negative for chills, diaphoresis, fever and malaise/fatigue.   HENT:  Negative for congestion, ear discharge, sinus pain and sore throat.    Eyes:  Negative for pain, discharge and redness.   Respiratory:  Positive for cough. Negative for shortness of breath and wheezing.    Cardiovascular:  Negative for chest pain.   Gastrointestinal:  Negative for abdominal pain, constipation, diarrhea, nausea and vomiting.   Neurological:  Negative for dizziness and headaches.      No Known Allergies  History reviewed. No pertinent past medical history.     Objective:   /80 (BP Location: Left arm, Patient Position: Sitting, BP Cuff Size: Adult)   Pulse 94   Temp 36.9 °C (98.4 °F) (Temporal)   Resp 14   Ht 1.676 m (5' 6\")   Wt 66 kg (145 lb 6.4 oz)   SpO2 96%   BMI 23.47 kg/m²   Physical Exam  Vitals and nursing note reviewed.   Constitutional:       General: He is not in acute distress.     Appearance: Normal appearance. He is not ill-appearing, toxic-appearing or diaphoretic.   HENT:      Head: Normocephalic.      Right Ear: Tympanic membrane, ear canal and external ear normal. There is no impacted cerumen.      Left Ear: Tympanic membrane, ear canal and external ear normal. There is no impacted cerumen.      Nose: No congestion or rhinorrhea.      Mouth/Throat:      Mouth: Mucous membranes are moist.      Pharynx: No oropharyngeal exudate or posterior oropharyngeal erythema.   Eyes:      General: "         Right eye: No discharge.         Left eye: No discharge.      Conjunctiva/sclera: Conjunctivae normal.   Cardiovascular:      Rate and Rhythm: Normal rate and regular rhythm.   Pulmonary:      Effort: Pulmonary effort is normal. No respiratory distress.      Breath sounds: Normal breath sounds. No stridor. No wheezing, rhonchi or rales.   Musculoskeletal:      Cervical back: Neck supple.   Lymphadenopathy:      Cervical: No cervical adenopathy.   Neurological:      General: No focal deficit present.      Mental Status: He is alert and oriented to person, place, and time.   Psychiatric:         Mood and Affect: Mood normal.         Behavior: Behavior normal.         Thought Content: Thought content normal.         Judgment: Judgment normal.             Diagnostic testing: None    Assessment/Plan:     Encounter Diagnoses   Name Primary?    Acute cough          Plan for care for today's complaint includes starting the patient on Tessalon Perles.  Encouraged use of over-the-counter medications for additional symptom relief.  Patient is likely suffering from a viral upper respiratory illness, no evidence to support antibiotic use at this time.  Lung auscultation was normal today, no rales, wheezes, rhonchi.  Vital signs were stable during today's office visit, patient was overall well-appearing. Continue to monitor symptoms and return to urgent care or follow-up with primary care provider if symptoms remain ongoing.  Follow-up in the emergency department if symptoms become severe, ER precautions discussed in office today.  Prescription for Tessalon Perles provided.    See AVS Instructions below for written guidance provided to patient on after-visit management and care in addition to our verbal discussion during the visit.    Please note that this dictation was created using voice recognition software. I have attempted to correct all errors, but there may be sound-alike, spelling, grammar and possibly content  errors that I did not discover before finalizing the note.    [place] Niki DURAN